# Patient Record
Sex: FEMALE | Race: BLACK OR AFRICAN AMERICAN | Employment: FULL TIME | URBAN - METROPOLITAN AREA
[De-identification: names, ages, dates, MRNs, and addresses within clinical notes are randomized per-mention and may not be internally consistent; named-entity substitution may affect disease eponyms.]

---

## 2018-07-02 ENCOUNTER — HOSPITAL ENCOUNTER (OUTPATIENT)
Dept: PHYSICAL THERAPY | Age: 38
End: 2018-07-02

## 2018-09-11 ENCOUNTER — HOSPITAL ENCOUNTER (OUTPATIENT)
Dept: PHYSICAL THERAPY | Age: 38
Discharge: HOME | End: 2018-09-11
Payer: MEDICAID

## 2018-09-11 PROCEDURE — 97162 PT EVAL MOD COMPLEX 30 MIN: CPT

## 2018-09-11 PROCEDURE — 97110 THERAPEUTIC EXERCISES: CPT
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
## 2018-09-11 NOTE — PROGRESS NOTES
PT DAILY TREATMENT NOTE     Patient Name: Lucio Ramos  Date:2018  : 1980  [x]  Patient  Verified  Payor: BLUE CROSS MEDICAID / Plan: [place] Amend / Product Type: Managed Care Medicaid /    In time:10:30  Out time:11:25  Total Treatment Time (min): 55  Visit #: 1 of 8    Treatment Area: Low back pain [M54.5]  Neck pain [M54.2]    SUBJECTIVE  Pain Level (0-10 scale): 7/10  Any medication changes, allergies to medications, adverse drug reactions, diagnosis change, or new procedure performed?: [x] No    [] Yes (see summary sheet for update)  Subjective functional status/changes:   [] No changes reported  SEE POC    OBJECTIVE  Physical Therapy Evaluation Cervical Spine - [facility]    OBJECTIVE  Posture: [] WNL  Head Position: forward head posture   Shoulder/Scapular Position: rounded shoulders  C-Kyphosis:  [] increased   [] decreased   C-Lordosis:   [] increased   [] decreased  T-Kyphosis:  [] increased   [] decreased  T-Lordosis:   [] increased   [] decreased     TMJ: [] N/A [] Abnormal - ROM:   Palpation:    Cervical Retraction: [] WNL    [] Abnormal:    Shoulder/Scapular Screen: [] WNL    [] Abnormal:    Active Movements: [] N/A   [] Too acute   [] Other:  ROM % AROM % PROM Comments:pain, area   Forward flexion 50     Extension 30     SB right 30     SB left  30     Rotation right 50     Rotation left 50       Thoracic Spine: [] N/A    [] WNL   [] Other:    PROM:    Palpation:  [] Min  [] Mod  [] Severe    Location:  [] Min  [] Mod  [] Severe    Location:  [] Min  [] Mod  [] Severe    Location:    Neuro Screen (myotome/dematome/felexes): [x] WNL  Myotome Level Muscle Test Myotome Level Muscle Test   C5 Shoulder Adduction - Deltoid C8 Finger Flexors   C6 Wrist Extension T1 Finger Abduction - Interossei   C7 Elbow Extension     Comments:    Gait:  [] Normal     [] Abnormal:    Active Movements: [] N/A   [] Too acute   [] Other:guarded secondary to pain in all directions  ROM % AROM % PROM Comments:pain, area   Forward flexion 40-60      Extension 20-30      SB right 20-30      SB left 20-30      Rotation right 5-10      Rotation left 5-10        Repeated Movements   Effects on present pain: produces (AK), abolishes (A), increases (incr), decreases (decr), centralizes (C), peripheral (PH), no effect (NE)   Pre-Test Sx Flexion Repeated Flexion Extension Repeated Extension Repeated SBL Repeated SBR   Sitting          Standing          Lying      N/A N/A   Comments:  Side Glide:  Sustained passive positioning test:    Neuro Screen [] WNL  Myotome/Dermatome/Reflexes: L5 affected with dermatomal levels left LE   Comments:    Dural Mobility:  SLR Sitting: [] R    [] L    [] +    [] -  @ (degrees):           Supine: [] R    [] L    [] +    [] -  @ (degrees):   Slump Test: [] R    [] L    [] +    [] -  @ (degrees):   Prone Knee Bend: [] R    [] L    [] +    [] -     Palpation  [] Min  [x] Mod  [] Severe    Location:paraspinals and L5 central anterior/posterior mobilization  [] Min  [] Mod  [] Severe    Location:  [] Min  [] Mod  [] Severe    Location:    Stabilization Tests  Multifidus Test  Level 1: Prone abdominal draw in (Goal 6-10mmHG):  Level 2: Supported leg load supine (needle deflection at 40mmHG): [] Yes  [] No   Level 3: Unsupported leg load supine (needle deflection at 40mmHG): [] Yes  [] No     Strength   L(0-5) R (0-5) N/T   Hip Flexion (L1,2) 3 3+ []   Knee Extension (L3,4) 4 4 []   Ankle Dorsiflexion (L4) 3+ 4- []   Great Toe Extension (L5)   []   Ankle Plantarflexion (S1)   []   Knee Flexion (S1,2) 3 4- []   Upper Abdominals   []   Lower Abdominals   []   Paraspinals   []   Back Rotators   []   Gluteus Darron   []   Other   []     Special Tests      25 min [x]Eval                  []Re-Eval       30 min Therapeutic Exercise:  [] See flow sheet :   Rationale: increase ROM, increase strength and improve coordination to improve the patients ability to increase patient's functional mobility and reviewed and provided with HEP      Other Objective/Functional Measures: FOTO 44     Pain Level (0-10 scale) post treatment: 6//10    ASSESSMENT/Changes in Function:   See POC  []  See Plan of Care  []  See progress note/recertification  []  See Discharge Summary         Progress towards goals / Updated goals:  SEE POC    PLAN  []  Upgrade activities as tolerated     [x]  Continue plan of care  []  Update interventions per flow sheet       []  Discharge due to:_  []  Other:_      Keyana Juarez 9/11/2018  10:36 AM    No future appointments.

## 2018-09-19 ENCOUNTER — HOSPITAL ENCOUNTER (OUTPATIENT)
Dept: PHYSICAL THERAPY | Age: 38
Discharge: HOME | End: 2018-09-19
Payer: MEDICAID
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
## 2018-09-21 ENCOUNTER — APPOINTMENT (OUTPATIENT)
Dept: PHYSICAL THERAPY | Age: 38
End: 2018-09-21
Payer: MEDICAID

## 2018-09-26 ENCOUNTER — HOSPITAL ENCOUNTER (OUTPATIENT)
Dept: PHYSICAL THERAPY | Age: 38
Discharge: HOME | End: 2018-09-26
Payer: MEDICAID

## 2018-09-26 PROCEDURE — 97140 MANUAL THERAPY 1/> REGIONS: CPT | Performed by: PHYSICAL THERAPIST

## 2018-09-26 PROCEDURE — 97110 THERAPEUTIC EXERCISES: CPT | Performed by: PHYSICAL THERAPIST
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
## 2018-09-28 ENCOUNTER — HOSPITAL ENCOUNTER (OUTPATIENT)
Dept: PHYSICAL THERAPY | Age: 38
Discharge: HOME | End: 2018-09-28
Payer: MEDICAID

## 2018-09-28 PROCEDURE — 97113 AQUATIC THERAPY/EXERCISES: CPT

## 2018-10-03 ENCOUNTER — HOSPITAL ENCOUNTER (OUTPATIENT)
Dept: PHYSICAL THERAPY | Age: 38
Discharge: HOME | End: 2018-10-03
Payer: MEDICAID

## 2018-10-03 PROCEDURE — 97113 AQUATIC THERAPY/EXERCISES: CPT

## 2018-10-04 ENCOUNTER — HOSPITAL ENCOUNTER (OUTPATIENT)
Dept: PHYSICAL THERAPY | Age: 38
Discharge: HOME | End: 2018-10-04
Payer: MEDICAID

## 2018-10-04 PROCEDURE — 97530 THERAPEUTIC ACTIVITIES: CPT

## 2018-10-04 PROCEDURE — 97110 THERAPEUTIC EXERCISES: CPT

## 2018-10-04 NOTE — PROGRESS NOTES
PT DAILY TREATMENT NOTE     Patient Name: Rush Braxton  Date:10/4/2018  : 1980  [x]  Patient  Verified  Payor: BLUE CROSS MEDICAID / Plan: [place] / Product Type: Managed Care Medicaid /    In time:11:30  Out time:12:27  Total Treatment Time (min): 57  Visit #: 5 of 8    Treatment Area: Low back pain [M54.5]  Neck pain [M54.2]    SUBJECTIVE  Pain Level (0-10 scale): 7/10  Any medication changes, allergies to medications, adverse drug reactions, diagnosis change, or new procedure performed?: [x] No    [] Yes (see summary sheet for update)  Subjective functional status/changes:   [] No changes reported  \"I have pain in my Right arm and down my legs that changes. My arm is the more painful areas. \"    OBJECTIVE      38 min Therapeutic Exercise:  [x] See flow sheet :   Rationale: increase ROM, increase strength and improve coordination to improve the patients ability to return to prior level of physical activity. 19 min Therapeutic Activity:  [x]  See flow sheet :   Rationale: increase ROM, increase strength and improve coordination  to improve the patients ability to perform ADLs with less pain. With   [] TE   [] TA   [] neuro   [] other: Patient Education: [x] Review HEP    [] Progressed/Changed HEP based on:   [] positioning   [] body mechanics   [] transfers   [] heat/ice application    [] other:      Other Objective/Functional Measures:      Pain Level (0-10 scale) post treatment: 5/10    ASSESSMENT/Changes in Function: Pt continues to report high levels of pain in neck and back. Pt reports consistent radicular pain in Right UE predominately distal to the elbow. Pt reports sporadic pain down B LE but seeming to change constantly from day to day. Pt performed therex well with general discomfort but progressively improved throughout session. Pt reported overall decreased pain and intensity but continued radicular signs in UE.      Patient will continue to benefit from skilled PT services to modify and progress therapeutic interventions, address functional mobility deficits, address ROM deficits, address strength deficits, analyze and address soft tissue restrictions, analyze and cue movement patterns, analyze and modify body mechanics/ergonomics and assess and modify postural abnormalities to attain remaining goals. []  See Plan of Care  []  See progress note/recertification  []  See Discharge Summary         Progress towards goals / Updated goals:  Short Term Goals: To be accomplished in 2 weeks:  1. Patient will be compliant with HEP as PT progresses to increase tolerance with functional activities. Status @ Eval: initiated HEP  Current : taught ex pt to incorporate in HEP      2. Patient will have improved cervical and lumbar AROM to 80% WNL to increase tolerance with transfers. Status @ Eval: guarded lumbar AROM and cervical below  Active Movements: [] N/A   [] Too acute   [] Other:  ROM % AROM % PROM Comments:pain, area   Forward flexion 50         Extension 30         SB right 30         SB left  30         Rotation right 50         Rotation left 50          3. Patient will report decreased pain to 5/10 at most with activities to be able to tolerate functional activities. Status @ Eval: 6/10 today worst 1/0/10      Long Term Goals: To be accomplished in 4 weeks:  1. Patient will be independent with HEP as PT progresses at time of discharge to be able to continue with maintenance program.  Status @ Eval: initiated  2. Patient will have increased LE strength by 1/2 grade to be able to tolerate prolonged ambulation while walking dog.   Status @ Eval: Strength     L(0-5) R (0-5) N/T   Hip Flexion (L1,2) 3 3+ []   Knee Extension (L3,4) 4 4 []   Ankle Dorsiflexion (L4) 3+ 4- []   Great Toe Extension (L5)       []   Ankle Plantarflexion (S1)       []   Knee Flexion (S1,2) 3 4- []   Upper Abdominals       []   Lower Abdominals       []   Paraspinals       []   Back Rotators       []   Gluteus Darron       []   Other       []    3. Patient will have decreased pain to 3/10 at most with activities to be able to tolerate functional activities. Status@ Eval: 6/10 today, 10/10 at worst  4. Patient will have improved FOTO score by 10 points indicating improvement in functional activities.   Status @ Eval: 40             PLAN  [x]  Upgrade activities as tolerated     [x]  Continue plan of care  []  Update interventions per flow sheet       []  Discharge due to:_  []  Other:_      Blaire Fletcher PTA 10/4/2018  11:55 AM    Future Appointments  Date Time Provider Ivan Nails   10/8/2018 11:00 AM Ling Kincaid, PT, DPT [facility]   10/10/2018 9:30 AM INDIA Mckeon [facility]

## 2018-10-08 ENCOUNTER — HOSPITAL ENCOUNTER (OUTPATIENT)
Dept: PHYSICAL THERAPY | Age: 38
Discharge: HOME | End: 2018-10-08
Payer: MEDICAID

## 2018-10-08 PROCEDURE — 97110 THERAPEUTIC EXERCISES: CPT | Performed by: PHYSICAL THERAPIST

## 2018-10-08 PROCEDURE — 97530 THERAPEUTIC ACTIVITIES: CPT | Performed by: PHYSICAL THERAPIST

## 2018-10-08 NOTE — PROGRESS NOTES
PT DAILY TREATMENT NOTE 12-16    Patient Name: Aileen Tao  Date:10/8/2018  : 1980  [x]  Patient  Verified  Payor: BLUE CROSS MEDICAID / Plan: Rozina Wren / Product Type: Managed Care Medicaid /    In time:11:10  Out time:11:51  Total Treatment Time (min): 41  Visit #: 6 of 8    Treatment Area: Low back pain [M54.5]  Neck pain [M54.2]    SUBJECTIVE  Pain Level (0-10 scale): 6  Any medication changes, allergies to medications, adverse drug reactions, diagnosis change, or new procedure performed?: [x] No    [] Yes (see summary sheet for update)  Subjective functional status/changes:   [] No changes reported  Feels alright. The arm is still hurting. No new issues. OBJECTIVE    30 min Therapeutic Exercise:  [x] See flow sheet :   Rationale: increase ROM, increase strength and decrease pain to improve the patients ability to complete ADLs    11 min Therapeutic Activity:  [x]  See flow sheet :   Rationale: increase ROM, increase strength and improve coordination  to improve the patients ability to complete ADLs            With   [] TE   [] TA   [] neuro   [] other: Patient Education: [x] Review HEP    [] Progressed/Changed HEP based on:   [] positioning   [] body mechanics   [] transfers   [] heat/ice application    [] other:      Other Objective/Functional Measures:      Pain Level (0-10 scale) post treatment: 6    ASSESSMENT/Changes in Function: Patient responds well to treatment session. Taken through lumbar mobility exercises today. No difficulty, reports benefit. Will reassess in next 1-2 sessions. . No adverse effects were noted from today's treatment session      Patient will continue to benefit from skilled PT services to modify and progress therapeutic interventions, address functional mobility deficits, address ROM deficits, address strength deficits, analyze and address soft tissue restrictions, analyze and cue movement patterns, analyze and modify body mechanics/ergonomics and assess and modify postural abnormalities to attain remaining goals. []  See Plan of Care  []  See progress note/recertification  []  See Discharge Summary         Progress towards goals / Updated goals:  Short Term Goals: To be accomplished in 2 weeks:  1. Patient will be compliant with HEP as PT progresses to increase tolerance with functional activities. Status @ Eval: initiated HEP  Current : taught ex pt to incorporate in HEP       2. Patient will have improved cervical and lumbar AROM to 80% WNL to increase tolerance with transfers. Status @ Eval: guarded lumbar AROM and cervical below  Active Movements: [] N/A   [] Too acute   [] Other:  ROM % AROM % PROM Comments:pain, area   Forward flexion 50         Extension 30         SB right 30         SB left  30         Rotation right 50         Rotation left 50          3. Patient will report decreased pain to 5/10 at most with activities to be able to tolerate functional activities. Status @ Eval: 6/10 today worst 1/0/10      Long Term Goals: To be accomplished in 4 weeks:  1. Patient will be independent with HEP as PT progresses at time of discharge to be able to continue with maintenance program.  Status @ Eval: initiated  2. Patient will have increased LE strength by 1/2 grade to be able to tolerate prolonged ambulation while walking dog. Status @ Eval: Strength     L(0-5) R (0-5) N/T   Hip Flexion (L1,2) 3 3+ []   Knee Extension (L3,4) 4 4 []   Ankle Dorsiflexion (L4) 3+ 4- []   Great Toe Extension (L5)       []   Ankle Plantarflexion (S1)       []   Knee Flexion (S1,2) 3 4- []   Upper Abdominals       []   Lower Abdominals       []   Paraspinals       []   Back Rotators       []   Gluteus Darron       []   Other       []    3. Patient will have decreased pain to 3/10 at most with activities to be able to tolerate functional activities. Status@ Eval: 6/10 today, 10/10 at worst  4.  Patient will have improved FOTO score by 10 points indicating improvement in functional activities.   Status @ Eval: 40       PLAN  []  Upgrade activities as tolerated     [x]  Continue plan of care  []  Update interventions per flow sheet       []  Discharge due to:_  []  Other:_      Delio Garrido PT, DPT 10/8/2018  11:48 AM    Future Appointments  Date Time Provider Ivan Nails   10/10/2018 9:30 AM INDIA Segal [facility]   10/16/2018 10:30 AM INDIA Segal [facility]   10/19/2018 11:00 AM INDIA Segal [facility]

## 2018-10-10 ENCOUNTER — HOSPITAL ENCOUNTER (OUTPATIENT)
Dept: PHYSICAL THERAPY | Age: 38
Discharge: HOME | End: 2018-10-10
Payer: MEDICAID

## 2018-10-10 PROCEDURE — 97113 AQUATIC THERAPY/EXERCISES: CPT

## 2018-10-10 NOTE — PROGRESS NOTES
PHYSICAL THERAPY - DAILY TREATMENT NOTE - AQUATIC THERAPY    Patient Name: Allyson Hernandez        Date: 10/10/2018  : 1980   YES Patient  Verified  Visit #:   7   of   8  Insurance: Payor: BLUE CROSS MEDICAID / Plan: Viviano Krabbe / Product Type: Managed Care Medicaid /      In time: 9:30 Out time: 10:15   Total Treatment Time: 45     Medicare/[facility] Time Tracking (below)   Total Timed Codes (min):   1:1 Treatment Time:       TREATMENT AREA =  Low back pain [M54.5]  Neck pain [M54.2]    SUBJECTIVE    Pain Level (on 0 to 10 scale):  5  / 10   Medication Changes/New allergies or changes in medical history, any new surgeries or procedures? NO    If yes, update Summary List   Subjective Functional Status/Changes:  []  No changes reported     \"The pain I really just in the arm. \"          OBJECTIVE      45 min Aquatic Therapy:  [x]  See flow sheet   Rationale:      increase ROM, increase strength, improve coordination and improve balance to improve the patients ability to return to prior level of physical activity. min Patient Education:  NO  Reviewed HEP   []  Progressed/Changed HEP based on: Other Objective/Functional Measures:         Post Treatment Pain Level (on 0 to 10) scale:   5  / 10     ASSESSMENT  Assessment/Changes in Function:     Pt reported increased fatigue with therex but no change in UE pain. Pt progressing with tolerance to activity slowly. []  See Progress Note/Recertification   Patient will continue to benefit from skilled PT services to modify and progress therapeutic interventions, address functional mobility deficits, address ROM deficits, address strength deficits, analyze and address soft tissue restrictions, analyze and cue movement patterns, analyze and modify body mechanics/ergonomics and assess and modify postural abnormalities to attain remaining goals.    Progress toward goals / Updated goals:    Short Term Goals: To be accomplished in 2 weeks:  1. Patient will be compliant with HEP as PT progresses to increase tolerance with functional activities. Status @ Eval: initiated HEP  Current : taught ex pt to incorporate in HEP       2. Patient will have improved cervical and lumbar AROM to 80% WNL to increase tolerance with transfers. Status @ Eval: guarded lumbar AROM and cervical below  Active Movements: [] N/A   [] Too acute   [] Other:  ROM % AROM % PROM Comments:pain, area   Forward flexion 50         Extension 30         SB right 30         SB left  30         Rotation right 50         Rotation left 50          3. Patient will report decreased pain to 5/10 at most with activities to be able to tolerate functional activities. Status @ Eval: 6/10 today worst 1/0/10      Long Term Goals: To be accomplished in 4 weeks:  1. Patient will be independent with HEP as PT progresses at time of discharge to be able to continue with maintenance program.  Status @ Eval: initiated  2. Patient will have increased LE strength by 1/2 grade to be able to tolerate prolonged ambulation while walking dog. Status @ Eval: Strength     L(0-5) R (0-5) N/T   Hip Flexion (L1,2) 3 3+ []   Knee Extension (L3,4) 4 4 []   Ankle Dorsiflexion (L4) 3+ 4- []   Great Toe Extension (L5)       []   Ankle Plantarflexion (S1)       []   Knee Flexion (S1,2) 3 4- []   Upper Abdominals       []   Lower Abdominals       []   Paraspinals       []   Back Rotators       []   Gluteus Darron       []   Other       []    3. Patient will have decreased pain to 3/10 at most with activities to be able to tolerate functional activities. Status@ Eval: 6/10 today, 10/10 at worst  4. Patient will have improved FOTO score by 10 points indicating improvement in functional activities.   Status @ Eval: 40       PLAN  [x]  Upgrade activities as tolerated YES Continue plan of care   []  Discharge due to :    []  Other:      Therapist: Lencho Garcia PTA    Date: 10/10/2018 Time: 1:55 PM     Future Appointments  Date Time Provider Ivan Nails   10/15/2018 10:00 AM Farhana Brown PT, DPT [facility]   10/16/2018 11:30 AM Farhana Brown PT, DPT [facility]   10/19/2018 11:00 AM INDIA Stroud [facility]

## 2018-10-15 ENCOUNTER — HOSPITAL ENCOUNTER (OUTPATIENT)
Dept: PHYSICAL THERAPY | Age: 38
Discharge: HOME | End: 2018-10-15
Payer: MEDICAID

## 2018-10-15 PROCEDURE — 97110 THERAPEUTIC EXERCISES: CPT | Performed by: PHYSICAL THERAPIST

## 2018-10-15 NOTE — PROGRESS NOTES
PT DAILY TREATMENT NOTE     Patient Name: Kemar Jett  Date:10/15/2018  : 1980  [x]  Patient  Verified  Payor: BLUE CROSS MEDICAID / Plan: Jarrod Denny / Product Type: Managed Care Medicaid /    In time:10:00  Out time:11:48  Total Treatment Time (min): 48  Visit #: 8 of 8    Treatment Area: Low back pain [M54.5]  Neck pain [M54.2]    SUBJECTIVE  Pain Level (0-10 scale): 6  Any medication changes, allergies to medications, adverse drug reactions, diagnosis change, or new procedure performed?: [x] No    [] Yes (see summary sheet for update)  Subjective functional status/changes:   [] No changes reported  Is getting tired of the pain. It's wearing on her    OBJECTIVE      48 min Therapeutic Exercise:  [x] See flow sheet :   Rationale: increase ROM, increase strength and decrease pain to improve the patients ability to complete ADLs          With   [] TE   [] TA   [] neuro   [] other: Patient Education: [x] Review HEP    [] Progressed/Changed HEP based on:   [] positioning   [] body mechanics   [] transfers   [] heat/ice application    [] other:      Other Objective/Functional Measures:      Strength Left Right    (at side) 70lbs 40lbs   Pinch  5.3lbs 4.7lbs             Lumbar ROMl: ~75% in all planes     Pain Level (0-10 scale) post treatment: 7    ASSESSMENT/Changes in Function: Patient responds well to treatment session. Patient has made minimal progress to date. Pain remains elevated since YOUSIF, strength seems decreased secondary to pain.  . No adverse effects were noted from today's treatment session      Patient will continue to benefit from skilled PT services to modify and progress therapeutic interventions, address functional mobility deficits, address ROM deficits, address strength deficits, analyze and address soft tissue restrictions, analyze and cue movement patterns, analyze and modify body mechanics/ergonomics and assess and modify postural abnormalities to attain remaining goals. []  See Plan of Care  [x]  See progress note/recertification  []  See Discharge Summary         Progress towards goals / Updated goals:  Short Term Goals: To be accomplished in 2 weeks:  1. Patient will be compliant with HEP as PT progresses to increase tolerance with functional activities. Status @ Eval: initiated HEP  Current : taught ex pt to incorporate in HEP       2. Patient will have improved cervical and lumbar AROM to 80% WNL to increase tolerance with transfers. Status @ Eval: guarded lumbar AROM and cervical below  Active Movements: [] N/A   [] Too acute   [] Other:  ROM % AROM % PROM Comments:pain, area   Forward flexion 50 75        Extension 30  75      SB right 30   75      SB left  30   75      Rotation right 50   75      Rotation left 50   75       3. Patient will report decreased pain to 5/10 at most with activities to be able to tolerate functional activities. Status @ Eval: 6/10 today worst 1/0/10      Long Term Goals: To be accomplished in 4 weeks:  1. Patient will be independent with HEP as PT progresses at time of discharge to be able to continue with maintenance program.  Status @ Eval: initiated  2. Patient will have increased LE strength by 1/2 grade to be able to tolerate prolonged ambulation while walking dog. Status @ Eval: Strength     L(0-5) R (0-5) N/T   Hip Flexion (L1,2) 3 3+ []   Knee Extension (L3,4) 4 4 []   Ankle Dorsiflexion (L4) 3+ 4- []   Great Toe Extension (L5)       []   Ankle Plantarflexion (S1)       []   Knee Flexion (S1,2) 3 4- []   Upper Abdominals       []   Lower Abdominals       []   Paraspinals       []   Back Rotators       []   Gluteus Darron       []   Other       []    3. Patient will have decreased pain to 3/10 at most with activities to be able to tolerate functional activities. Status@ Eval: 6/10 today, 10/10 at worst  4.  Patient will have improved FOTO score by 10 points indicating improvement in functional activities.   Status @ Eval: 40      PLAN  []  Upgrade activities as tolerated     [x]  Continue plan of care  []  Update interventions per flow sheet       []  Discharge due to:_  []  Other:_      Yves Pichardo PT, DPT 10/15/2018  10:06 AM    Future Appointments  Date Time Provider Ivan Nails   10/16/2018 11:30 AM Yves Pichardo PT, DPT [facility]   10/19/2018 11:00 AM INDIA Johnson [facility]

## 2018-10-16 ENCOUNTER — HOSPITAL ENCOUNTER (OUTPATIENT)
Dept: PHYSICAL THERAPY | Age: 38
Discharge: HOME | End: 2018-10-16
Payer: MEDICAID

## 2018-10-16 PROCEDURE — 97110 THERAPEUTIC EXERCISES: CPT | Performed by: PHYSICAL THERAPIST

## 2018-10-16 PROCEDURE — 97530 THERAPEUTIC ACTIVITIES: CPT | Performed by: PHYSICAL THERAPIST

## 2018-10-16 NOTE — PROGRESS NOTES
PT DAILY TREATMENT NOTE     Patient Name: Jin Riojas  Date:10/16/2018  : 1980  [x]  Patient  Verified  Payor: BLUE CROSS MEDICAID / Plan: [employer] / Product Type: Managed Care Medicaid /    In time:11:02  Out time:11:48  Total Treatment Time (min): 46  Visit #:  16    Treatment Area: Low back pain [M54.5]  Neck pain [M54.2]    SUBJECTIVE  Pain Level (0-10 scale): 7  Any medication changes, allergies to medications, adverse drug reactions, diagnosis change, or new procedure performed?: [x] No    [] Yes (see summary sheet for update)  Subjective functional status/changes:   [] No changes reported  Lots of pain today. Went home and cleaned up the house a lot and the pain was much worse afterwards    OBJECTIVE    30 min Therapeutic Exercise:  [x] See flow sheet :   Rationale: increase ROM, increase strength and decrease pain to improve the patients ability to complete ADLs    16 min Therapeutic Activity:  [x]  See flow sheet :   Rationale: increase ROM, increase strength and improve coordination  to improve the patients ability to complete ADLs            With   [] TE   [] TA   [] neuro   [] other: Patient Education: [x] Review HEP    [] Progressed/Changed HEP based on:   [] positioning   [] body mechanics   [] transfers   [] heat/ice application    [] other:      Other Objective/Functional Measures:      Pain Level (0-10 scale) post treatment: 7    ASSESSMENT/Changes in Function: Patient responds well to treatment session. Has minimal difficulty with exercises as prescribed. Pain levels continue to be elevated. Provided with nerve glides.  No adverse effects were noted from today's treatment session      Patient will continue to benefit from skilled PT services to modify and progress therapeutic interventions, address functional mobility deficits, address ROM deficits, address strength deficits, analyze and address soft tissue restrictions, analyze and cue movement patterns, analyze and modify body mechanics/ergonomics and assess and modify postural abnormalities to attain remaining goals. []  See Plan of Care  []  See progress note/recertification  []  See Discharge Summary         Progress towards goals / Updated goals:  Updated Goals: to be achieved in 4 weeks:                        Short Term Goals: To be accomplished in 2 weeks:  1. Patient will be compliant with HEP as PT progresses to increase tolerance with functional activities. Status @ Eval: initiated HEP  Current : taught ex pt to incorporate in HEP       2. Patient will have improved cervical and lumbar AROM to 80% WNL to increase tolerance with transfers. Status @ Eval: guarded lumbar AROM and cervical below  Active Movements: [] N/A   [] Too acute   [] Other:  ROM % AROM % PROM Comments:pain, area   Forward flexion 50 75        Extension 30  75      SB right 30   75      SB left  30   75      Rotation right 50   75      Rotation left 50   75       3. Patient will report decreased pain to 5/10 at most with activities to be able to tolerate functional activities. Status @ Eval: 6/10 today worst 1/0/10      Long Term Goals: To be accomplished in 4 weeks:  1. Patient will be independent with HEP as PT progresses at time of discharge to be able to continue with maintenance program.  Status @ Eval: initiated  2. Patient will have increased LE strength by 1/2 grade to be able to tolerate prolonged ambulation while walking dog.   Status @ Eval: Strength     L(0-5) R (0-5) N/T   Hip Flexion (L1,2) 3 3+ []   Knee Extension (L3,4) 4 4 []   Ankle Dorsiflexion (L4) 3+ 4- []   Great Toe Extension (L5)       []   Ankle Plantarflexion (S1)       []   Knee Flexion (S1,2) 3 4- []   Upper Abdominals       []   Lower Abdominals       []   Paraspinals       []   Back Rotators       []   Gluteus Darron       []   Other       []    3. Patient will have decreased pain to 3/10 at most with activities to be able to tolerate functional activities. Status@ Eval: 6/10 today, 10/10 at worst  4. Patient will have improved FOTO score by 10 points indicating improvement in functional activities.   Status @ Eval: 40      PLAN  []  Upgrade activities as tolerated     [x]  Continue plan of care  []  Update interventions per flow sheet       []  Discharge due to:_  []  Other:_      Rosario Feng, PT, DPT 10/16/2018  11:22 AM    Future Appointments  Date Time Provider Ivan Nails   10/16/2018 11:30 AM [facility] Bone[facility]   10/19/2018 11:00 AM Tayler Agudelo, PTA [facility]   10/22/2018 10:30 AM Rosario Bone, PT, DPT [facility]   10/24/2018 10:30 AM Rosario Bone, PT, DPT [facility]   10/25/2018 9:00 AM Rosario Bone, PT, DPT [facility]   10/30/2018 11:00 AM Rosario Bone, PT, DPT [facility]   10/31/2018 10:30 AM Rosario Bone, PT, DPT [facility]   11/2/2018 11:00 AM Rosario Bone, PT, DPT [facility]

## 2018-10-16 NOTE — PROGRESS NOTES
[facility] at [address] [place] [address]  Phone: [phone]   Fax: [phone]    Progress Note  Patient name: Simona Ramos Start of Care: 2018   Referral source: Jazmine Moralez MD : 1980   Medical/Treatment Diagnosis: Low back pain [M54.5]  Neck pain [M54.2] Onset Date:2018     Prior Hospitalization: see medical history Provider#: [id number]   Medications: Verified on Patient Summary List    Comorbidities: OA  Prior Level of Function:independent with ADLs    Visits from Start of Care: 9    Missed Visits: 0    Established Goals:          Excellent Good         Limited           None  [x] Increased ROM   []  [x]  []  []  [x] Increased Strength  []  []  [x]  []  [x] Increased Mobility  []  [x]  []  []   [x] Decreased Pain   []  []  [x]  []  [] Decreased Swelling  []  []  []  []    Key Functional Changes: See Below  Updated Goals: to be achieved in 4 weeks:   Short Term Goals: To be accomplished in 2 weeks:  1. Patient will be compliant with HEP as PT progresses to increase tolerance with functional activities. Status @ Eval: initiated HEP  Current : taught ex pt to incorporate in HEP       2. Patient will have improved cervical and lumbar AROM to 80% WNL to increase tolerance with transfers. Status @ Eval: guarded lumbar AROM and cervical below  Active Movements: [] N/A   [] Too acute   [] Other:  ROM % AROM % PROM Comments:pain, area   Forward flexion 50 75        Extension 30  75      SB right 30   75      SB left  30   75      Rotation right 50   75      Rotation left 50   75       3. Patient will report decreased pain to 5/10 at most with activities to be able to tolerate functional activities. Status @ Eval: 6/10 today worst 1/0/10      Long Term Goals: To be accomplished in 4 weeks:  1.  Patient will be independent with HEP as PT progresses at time of discharge to be able to continue with maintenance program.  Status @ Eval: initiated  2. Patient will have increased LE strength by 1/2 grade to be able to tolerate prolonged ambulation while walking dog. Status @ Eval: Strength     L(0-5) R (0-5) N/T   Hip Flexion (L1,2) 3 3+ []   Knee Extension (L3,4) 4 4 []   Ankle Dorsiflexion (L4) 3+ 4- []   Great Toe Extension (L5)       []   Ankle Plantarflexion (S1)       []   Knee Flexion (S1,2) 3 4- []   Upper Abdominals       []   Lower Abdominals       []   Paraspinals       []   Back Rotators       []   Gluteus Darron       []   Other       []    3. Patient will have decreased pain to 3/10 at most with activities to be able to tolerate functional activities. Status@ Eval: 6/10 today, 10/10 at worst  4. Patient will have improved FOTO score by 10 points indicating improvement in functional activities. Status @ Eval: 44    ASSESSMENT/RECOMMENDATIONS:  Patient responds well to treatment session. Patient has made minimal progress to date. Pain remains elevated since YOUSIF, strength seems decreased secondary to pain. Patient will continue to benefit from skilled PT services to modify and progress therapeutic interventions, address functional mobility deficits, address ROM deficits, address strength deficits, analyze and address soft tissue restrictions, analyze and cue movement patterns, analyze and modify body mechanics/ergonomics and assess and modify postural abnormalities to attain remaining goals.     [x]Continue therapy per initial plan/protocol at a frequency of  2 x per week for 4 weeks  []Continue therapy with the following recommended changes:_____________________      _____________________________________________________________________  []Discontinue therapy progressing towards or have reached established goals  []Discontinue therapy due to lack of appreciable progress towards goals  []Discontinue therapy due to lack of attendance or compliance  []Await Physician's recommendations/decisions regarding therapy  []Other:________________________________________________________________    Thank you for this referral.   Karna Simmonds, PT, DPT 10/16/2018 2:11 PM  NOTE TO PHYSICIAN:  [address]   FAX TO [facility]: [id number]) [phone]  If you are unable to process this request in 24 hours please contact our office:   [phone]  []  I have read the above report and request that my patient continue as recommended. []  I have read the above report and request that my patient continue therapy with the following changes/special instructions:________________________________________  []I have read the above report and request that my patient be discharged from therapy.     Physicians signature: ______________________________Date: ______Time:______

## 2018-10-19 ENCOUNTER — HOSPITAL ENCOUNTER (OUTPATIENT)
Dept: PHYSICAL THERAPY | Age: 38
Discharge: HOME | End: 2018-10-19
Payer: MEDICAID

## 2018-10-19 PROCEDURE — 97113 AQUATIC THERAPY/EXERCISES: CPT

## 2018-10-19 NOTE — PROGRESS NOTES
PHYSICAL THERAPY - DAILY TREATMENT NOTE - AQUATIC THERAPY    Patient Name: Armida Bangura        Date: 10/19/2018  : 1980   YES Patient  Verified  Visit #:   10   of   16  Insurance: Payor: BLUE CROSS MEDICAID / Plan: Kate Schlatter / Product Type: Managed Care Medicaid /      In time: 10:45 Out time: 11:25   Total Treatment Time: 45     Medicare/BCBS Time Tracking (below)   Total Timed Codes (min):  45 1:1 Treatment Time:  25     TREATMENT AREA =  Low back pain [M54.5]  Neck pain [M54.2]    SUBJECTIVE    Pain Level (on 0 to 10 scale):  3  / 10   Medication Changes/New allergies or changes in medical history, any new surgeries or procedures? NO    If yes, update Summary List   Subjective Functional Status/Changes:  []  No changes reported     \"I actually don't have a lot of pain. I'm having a pretty good day. '          OBJECTIVE      45 min Aquatic Therapy:  [x]  See flow sheet   Rationale:      increase ROM, increase strength and improve coordination to improve the patients ability to return to prior level of physical activity. min Patient Education:  YES  Reviewed HEP   []  Progressed/Changed HEP based on: Other Objective/Functional Measures:         Post Treatment Pain Level (on 0 to 10) scale:   3  / 10     ASSESSMENT  Assessment/Changes in Function:     Pt tolerated aqua session very well. Due to decreased chagllenge of aquatic ex, pt is no longer appropriate for pool. Pt will attend land therapy for future poc. []  See Progress Note/Recertification   Patient will continue to benefit from skilled PT services to modify and progress therapeutic interventions, address functional mobility deficits, address ROM deficits, address strength deficits, analyze and address soft tissue restrictions, analyze and cue movement patterns, analyze and modify body mechanics/ergonomics and assess and modify postural abnormalities to attain remaining goals.    Progress toward goals / Updated goals:        Short Term Goals: To be accomplished in 2 weeks:  1. Patient will be compliant with HEP as PT progresses to increase tolerance with functional activities. Status @ Eval: initiated HEP  Current : taught ex pt to incorporate in HEP       2. Patient will have improved cervical and lumbar AROM to 80% WNL to increase tolerance with transfers. Status @ Eval: guarded lumbar AROM and cervical below  Active Movements: [] N/A   [] Too acute   [] Other:  ROM % AROM % PROM Comments:pain, area   Forward flexion 50 75        Extension 30  75      SB right 30   75      SB left  30   75      Rotation right 50   75      Rotation left 50   75       3. Patient will report decreased pain to 5/10 at most with activities to be able to tolerate functional activities. Status @ Eval: 6/10 today worst 1/0/10      Long Term Goals: To be accomplished in 4 weeks:  1. Patient will be independent with HEP as PT progresses at time of discharge to be able to continue with maintenance program.  Status @ Eval: initiated  2. Patient will have increased LE strength by 1/2 grade to be able to tolerate prolonged ambulation while walking dog. Status @ Eval: Strength     L(0-5) R (0-5) N/T   Hip Flexion (L1,2) 3 3+ []    Knee Extension (L3,4) 4 4 []    Ankle Dorsiflexion (L4) 3+ 4- []    Great Toe Extension (L5)       []    Ankle Plantarflexion (S1)       []    Knee Flexion (S1,2) 3 4- []    Upper Abdominals       []    Lower Abdominals       []    Paraspinals       []    Back Rotators       []    Gluteus Darron       []    Other       []     3. Patient will have decreased pain to 3/10 at most with activities to be able to tolerate functional activities. Status@ Eval: 6/10 today, 10/10 at worst  4. Patient will have improved FOTO score by 10 points indicating improvement in functional activities.   Status @ Eval: 44              PLAN  [x]  Upgrade activities as tolerated YES Continue plan of care   []  Discharge due to :    [] Other:      Therapist: Tayler Agudelo PTA    Date: 10/19/2018 Time: 1:41 PM     Future Appointments   Date Time Provider Ivan Nails   10/22/2018 10:30 AM Néstor Barba PT, DPT [facility]   10/24/2018 10:30 AM Néstor Barba PT, DPT [facility]   10/25/2018  9:00 AM Néstor Barba PT, DPT [facility]   10/30/2018 11:00 AM Néstor Barba PT, DPT [facility]   10/31/2018 10:30 AM Néstor Barba PT, DPT [facility]   11/2/2018 11:00 AM Néstor Barba PT, DPT [facility]

## 2018-10-22 ENCOUNTER — HOSPITAL ENCOUNTER (OUTPATIENT)
Dept: PHYSICAL THERAPY | Age: 38
Discharge: HOME | End: 2018-10-22
Payer: MEDICAID

## 2018-10-22 PROCEDURE — 97110 THERAPEUTIC EXERCISES: CPT | Performed by: PHYSICAL THERAPIST

## 2018-10-22 NOTE — PROGRESS NOTES
PT DAILY TREATMENT NOTE 12    Patient Name: Allyson Hernandez  Date:10/22/2018  : 1980  [x]  Patient  Verified  Payor: BLUE CROSS MEDICAID / Plan: Viviano Krabbe / Product Type: Managed Care Medicaid /    In time:10:25  Out time:11:00  Total Treatment Time (min): 35  Visit #: 11 of 18    Treatment Area: Low back pain [M54.5]  Neck pain [M54.2]    SUBJECTIVE  Pain Level (0-10 scale): 6  Any medication changes, allergies to medications, adverse drug reactions, diagnosis change, or new procedure performed?: [x] No    [] Yes (see summary sheet for update)  Subjective functional status/changes:   [] No changes reported  Feels so-so    OBJECTIVE    35 min Therapeutic Exercise:  [x] See flow sheet :   Rationale: increase ROM, increase strength and decrease pain to improve the patients ability to complete ADLs          With   [] TE   [] TA   [] neuro   [] other: Patient Education: [x] Review HEP    [] Progressed/Changed HEP based on:   [] positioning   [] body mechanics   [] transfers   [] heat/ice application    [] other:      Other Objective/Functional Measures:      Pain Level (0-10 scale) post treatment: 6    ASSESSMENT/Changes in Function: Patient responds well to treatment session. Patient difficulty with exercises due to pain. No adverse effects were noted from today's treatment session      Patient will continue to benefit from skilled PT services to modify and progress therapeutic interventions, address functional mobility deficits, address ROM deficits, address strength deficits, analyze and address soft tissue restrictions, analyze and cue movement patterns, analyze and modify body mechanics/ergonomics and assess and modify postural abnormalities to attain remaining goals.      []  See Plan of Care  []  See progress note/recertification  []  See Discharge Summary         Progress towards goals / Updated goals:  Progress toward goals / Updated goals:         Short Term Goals: To be accomplished in 2 weeks:  1. Patient will be compliant with HEP as PT progresses to increase tolerance with functional activities. Status @ Eval: initiated HEP  Current : taught ex pt to incorporate in HEP       2. Patient will have improved cervical and lumbar AROM to 80% WNL to increase tolerance with transfers. Status @ Eval: guarded lumbar AROM and cervical below  Active Movements: [] N/A   [] Too acute   [] Other:  ROM % AROM % PROM Comments:pain, area   Forward flexion 50 75        Extension 30  75      SB right 30   75      SB left  30   75      Rotation right 50   75      Rotation left 50   75       3. Patient will report decreased pain to 5/10 at most with activities to be able to tolerate functional activities. Status @ Eval: 6/10 today worst 1/0/10      Long Term Goals: To be accomplished in 4 weeks:  1. Patient will be independent with HEP as PT progresses at time of discharge to be able to continue with maintenance program.  Status @ Eval: initiated  2. Patient will have increased LE strength by 1/2 grade to be able to tolerate prolonged ambulation while walking dog. Status @ Eval: Strength     L(0-5) R (0-5) N/T   Hip Flexion (L1,2) 3 3+ []    Knee Extension (L3,4) 4 4 []    Ankle Dorsiflexion (L4) 3+ 4- []    Great Toe Extension (L5)       []    Ankle Plantarflexion (S1)       []    Knee Flexion (S1,2) 3 4- []    Upper Abdominals       []    Lower Abdominals       []    Paraspinals       []    Back Rotators       []    Gluteus Darron       []    Other       []     3. Patient will have decreased pain to 3/10 at most with activities to be able to tolerate functional activities. Status@ Eval: 6/10 today, 10/10 at worst  4. Patient will have improved FOTO score by 10 points indicating improvement in functional activities.   Status @ Eval: 40          PLAN  []  Upgrade activities as tolerated     [x]  Continue plan of care  []  Update interventions per flow sheet       []  Discharge due to:_  [] Other:_      Danny Presley, PT, DPT 10/22/2018  7:50 PM    Future Appointments   Date Time Provider Ivan Nials   10/24/2018 10:30 AM [facility]   10/25/2018  9:00 AM Homer Garner PT, DPT [facility]   10/30/2018 11:00 AM Homer Garner PT, DPT [facility]   10/31/2018 10:30 AM Homer Garner PT, DPT [facility]   11/2/2018 11:00 AM Homer Garner PT, DPT [facility]

## 2018-10-24 ENCOUNTER — HOSPITAL ENCOUNTER (OUTPATIENT)
Dept: PHYSICAL THERAPY | Age: 38
Discharge: HOME | End: 2018-10-24
Payer: MEDICAID

## 2018-10-24 PROCEDURE — 97110 THERAPEUTIC EXERCISES: CPT | Performed by: PHYSICAL THERAPIST

## 2018-10-24 NOTE — PROGRESS NOTES
PT DAILY TREATMENT NOTE     Patient Name: Aniceto Hughes  Date:10/24/2018  : 1980  [x]  Patient  Verified  Payor: BLUE CROSS MEDICAID / Plan: Santos Rogers / Product Type: Managed Care Medicaid /    In time:10:25  Out time:11:03  Total Treatment Time (min): 38  Visit #: 12 of 18    Treatment Area: Low back pain [M54.5]  Neck pain [M54.2]    SUBJECTIVE  Pain Level (0-10 scale): 4-5  Any medication changes, allergies to medications, adverse drug reactions, diagnosis change, or new procedure performed?: [x] No    [] Yes (see summary sheet for update)  Subjective functional status/changes:   [] No changes reported  Feel so-so    OBJECTIVE    38 min Therapeutic Exercise:  [] See flow sheet :   Rationale: increase ROM, increase strength and decrease pain to improve the patients ability to complete ADLs    With   [] TE   [] TA   [] neuro   [] other: Patient Education: [x] Review HEP    [] Progressed/Changed HEP based on:   [] positioning   [] body mechanics   [] transfers   [] heat/ice application    [] other:      Other Objective/Functional Measures:      Pain Level (0-10 scale) post treatment: 4    ASSESSMENT/Changes in Function: Patient responds well to treatment session. Patient appears to be progressing well. Has decreased pain. Increased activity tolerance. No adverse effects were noted from today's treatment session      Patient will continue to benefit from skilled PT services to modify and progress therapeutic interventions, address functional mobility deficits, address ROM deficits, address strength deficits, analyze and address soft tissue restrictions, analyze and cue movement patterns, analyze and modify body mechanics/ergonomics and assess and modify postural abnormalities to attain remaining goals.      []  See Plan of Care  []  See progress note/recertification  []  See Discharge Summary         Progress towards goals / Updated goals:        Short Term Goals: To be accomplished in 2 weeks:  1. Patient will be compliant with HEP as PT progresses to increase tolerance with functional activities. Status @ Eval: initiated HEP  Current : taught ex pt to incorporate in HEP       2. Patient will have improved cervical and lumbar AROM to 80% WNL to increase tolerance with transfers. Status @ Eval: guarded lumbar AROM and cervical below  Active Movements: [] N/A   [] Too acute   [] Other:  ROM % AROM % PROM Comments:pain, area   Forward flexion 50 75        Extension 30  75      SB right 30   75      SB left  30   75      Rotation right 50   75      Rotation left 50   75       3. Patient will report decreased pain to 5/10 at most with activities to be able to tolerate functional activities. Status @ Eval: 6/10 today worst 1/0/10      Long Term Goals: To be accomplished in 4 weeks:  1. Patient will be independent with HEP as PT progresses at time of discharge to be able to continue with maintenance program.  Status @ Eval: initiated  2. Patient will have increased LE strength by 1/2 grade to be able to tolerate prolonged ambulation while walking dog. Status @ Eval: Strength     L(0-5) R (0-5) N/T   Hip Flexion (L1,2) 3 3+ []    Knee Extension (L3,4) 4 4 []    Ankle Dorsiflexion (L4) 3+ 4- []    Great Toe Extension (L5)       []    Ankle Plantarflexion (S1)       []    Knee Flexion (S1,2) 3 4- []    Upper Abdominals       []    Lower Abdominals       []    Paraspinals       []    Back Rotators       []    Gluteus Darron       []    Other       []     3. Patient will have decreased pain to 3/10 at most with activities to be able to tolerate functional activities. Status@ Eval: 6/10 today, 10/10 at worst  4. Patient will have improved FOTO score by 10 points indicating improvement in functional activities.   Status @ Eval: 40          PLAN  []  Upgrade activities as tolerated     [x]  Continue plan of care  []  Update interventions per flow sheet       []  Discharge due to:_  [] Other:_      Ainsley Saul, PT, DPT 10/24/2018  10:43 AM    Future Appointments   Date Time Provider Ivan Jaqui   10/25/2018  9:00 AM Raheel Hollins PT, DPT [facility]   10/30/2018 11:00 AM Raheel Hollins PT, DPT [facility]   10/31/2018 10:30 AM Raheel Hollins PT, DPT [facility]   11/2/2018 11:00 AM Raheel Hollins PT, DPT [facility]

## 2018-10-25 ENCOUNTER — HOSPITAL ENCOUNTER (OUTPATIENT)
Dept: PHYSICAL THERAPY | Age: 38
Discharge: HOME | End: 2018-10-25
Payer: MEDICAID

## 2018-10-25 PROCEDURE — 97110 THERAPEUTIC EXERCISES: CPT | Performed by: PHYSICAL THERAPIST

## 2018-10-25 NOTE — PROGRESS NOTES
PT DAILY TREATMENT NOTE 12-16    Patient Name: Jin Riojas  Date:10/25/2018  : 1980  [x]  Patient  Verified  Payor: BLUE CROSS MEDICAID / Plan: [employer] / Product Type: Managed Care Medicaid /    In time:9:33  Out time:10:15  Total Treatment Time (min): 42  Visit #: 13 of 18    Treatment Area: Low back pain [M54.5]  Neck pain [M54.2]    SUBJECTIVE  Pain Level (0-10 scale): 3  Any medication changes, allergies to medications, adverse drug reactions, diagnosis change, or new procedure performed?: [x] No    [] Yes (see summary sheet for update)  Subjective functional status/changes:   [] No changes reported  Feels good today. No new issues. OBJECTIVE    30 min Therapeutic Exercise:  [x] See flow sheet :   Rationale: increase ROM, increase strength and decrease pain to improve the patients ability to complete ADLs    12 min Therapeutic Activity:  [x]  See flow sheet :   Rationale: increase ROM, increase strength and improve coordination  to improve the patients ability to complete ADLs             With   [] TE   [] TA   [] neuro   [] other: Patient Education: [x] Review HEP    [] Progressed/Changed HEP based on:   [] positioning   [] body mechanics   [] transfers   [] heat/ice application    [] other:      Other Objective/Functional Measures:      Pain Level (0-10 scale) post treatment: 3    ASSESSMENT/Changes in Function: Patient responds well to treatment session. Patient appears to be progressing well. No new issues. . No adverse effects were noted from today's treatment session      Patient will continue to benefit from skilled PT services to modify and progress therapeutic interventions, address functional mobility deficits, address ROM deficits, address strength deficits, analyze and address soft tissue restrictions, analyze and cue movement patterns, analyze and modify body mechanics/ergonomics and assess and modify postural abnormalities to attain remaining goals. []  See Plan of Care  []  See progress note/recertification  []  See Discharge Summary         Progress towards goals / Updated goals:      Short Term Goals: To be accomplished in 2 weeks:  1. Patient will be compliant with HEP as PT progresses to increase tolerance with functional activities. Status @ Eval: initiated HEP  Current : taught ex pt to incorporate in HEP       2. Patient will have improved cervical and lumbar AROM to 80% WNL to increase tolerance with transfers. Status @ Eval: guarded lumbar AROM and cervical below  Active Movements: [] N/A   [] Too acute   [] Other:  ROM % AROM % PROM Comments:pain, area   Forward flexion 50 75        Extension 30  75      SB right 30   75      SB left  30   75      Rotation right 50   75      Rotation left 50   75       3. Patient will report decreased pain to 5/10 at most with activities to be able to tolerate functional activities. Status @ Eval: 6/10 today worst 1/0/10      Long Term Goals: To be accomplished in 4 weeks:  1. Patient will be independent with HEP as PT progresses at time of discharge to be able to continue with maintenance program.  Status @ Eval: initiated  2. Patient will have increased LE strength by 1/2 grade to be able to tolerate prolonged ambulation while walking dog. Status @ Eval: Strength     L(0-5) R (0-5) N/T   Hip Flexion (L1,2) 3 3+ []    Knee Extension (L3,4) 4 4 []    Ankle Dorsiflexion (L4) 3+ 4- []    Great Toe Extension (L5)       []    Ankle Plantarflexion (S1)       []    Knee Flexion (S1,2) 3 4- []    Upper Abdominals       []    Lower Abdominals       []    Paraspinals       []    Back Rotators       []    Gluteus Darron       []    Other       []     3. Patient will have decreased pain to 3/10 at most with activities to be able to tolerate functional activities. Status@ Eval: 6/10 today, 10/10 at worst  4. Patient will have improved FOTO score by 10 points indicating improvement in functional activities.   Status @ Eval: 44          PLAN  []  Upgrade activities as tolerated     [x]  Continue plan of care  []  Update interventions per flow sheet       []  Discharge due to:_  []  Other:_      Yves Pichardo PT, DPT 10/25/2018  9:32 AM    Future Appointments   Date Time Provider Ivan Nails   10/30/2018 11:00 AM Tamra Johnson PT, DPT [facility]   10/31/2018 10:30 AM Tamra Johnson PT, DPT [facility]   11/2/2018 11:00 AM Tamra Johnson PT, JUANA REYNOSO [facility]

## 2018-10-30 ENCOUNTER — HOSPITAL ENCOUNTER (OUTPATIENT)
Dept: PHYSICAL THERAPY | Age: 38
Discharge: HOME | End: 2018-10-30
Payer: MEDICAID

## 2018-10-30 PROCEDURE — 97110 THERAPEUTIC EXERCISES: CPT

## 2018-10-30 PROCEDURE — 97530 THERAPEUTIC ACTIVITIES: CPT

## 2018-10-30 NOTE — PROGRESS NOTES
PT DAILY TREATMENT NOTE     Patient Name: Yun De Santiago  Date:10/30/2018  : 1980  [x]  Patient  Verified  Payor: BLUE CROSS MEDICAID / Plan: Raul Larsen / Product Type: Managed Care Medicaid /    In time:11:00  Out time:11:40  Total Treatment Time (min): 40  Visit #: 14 of 18    Treatment Area: Low back pain [M54.5]  Neck pain [M54.2]    SUBJECTIVE  Pain Level (0-10 scale): 4/10  Any medication changes, allergies to medications, adverse drug reactions, diagnosis change, or new procedure performed?: [x] No    [] Yes (see summary sheet for update)  Subjective functional status/changes:   [] No changes reported  \"I just have some pain in my low back. I had pain in my neck last night but it's better today. \"    OBJECTIVE    30 min Therapeutic Exercise:  [x] See flow sheet :   Rationale: increase ROM, increase strength and improve coordination to improve the patients ability to return to prior level of physical activity. 10 min Therapeutic Activity:  [x]  See flow sheet : lunge and squat performance   Rationale: increase ROM, increase strength and improve coordination  to improve the patients ability to return to prior level of physical activity. With   [] TE   [] TA   [] neuro   [] other: Patient Education: [x] Review HEP    [] Progressed/Changed HEP based on:   [] positioning   [] body mechanics   [] transfers   [] heat/ice application    [] other:      Other Objective/Functional Measures:      Pain Level (0-10 scale) post treatment:     ASSESSMENT/Changes in Function: Pt continues to be challenged by therex with no increased pain. Pt reports increased fatigue and slight discomfort with therex but none in neck. Pt had no adverse affects post tx.      Patient will continue to benefit from skilled PT services to modify and progress therapeutic interventions, address functional mobility deficits, address ROM deficits, address strength deficits, analyze and address soft tissue restrictions, analyze and cue movement patterns, analyze and modify body mechanics/ergonomics and assess and modify postural abnormalities to attain remaining goals. []  See Plan of Care  []  See progress note/recertification  []  See Discharge Summary         Progress towards goals / Updated goals:    Short Term Goals: To be accomplished in 2 weeks:  1. Patient will be compliant with HEP as PT progresses to increase tolerance with functional activities. Status @ Eval: initiated HEP  Current : taught ex pt to incorporate in HEP        2. Patient will have improved cervical and lumbar AROM to 80% WNL to increase tolerance with transfers. Status @ Eval: guarded lumbar AROM and cervical below  Active Movements: [] N/A   [] Too acute   [] Other:  ROM % AROM % PROM Comments:pain, area   Forward flexion 50 75        Extension 30  75      SB right 30   75      SB left  30   75      Rotation right 50   75      Rotation left 50   75       3. Patient will report decreased pain to 5/10 at most with activities to be able to tolerate functional activities. Status @ Eval: 6/10 today worst 1/0/10      Long Term Goals: To be accomplished in 4 weeks:  1. Patient will be independent with HEP as PT progresses at time of discharge to be able to continue with maintenance program.  Status @ Eval: initiated  Current: Pt reports compliance of none appointment days participation 10/30/18  2. Patient will have increased LE strength by 1/2 grade to be able to tolerate prolonged ambulation while walking dog.   Status @ Eval: Strength     L(0-5) R (0-5) N/T   Hip Flexion (L1,2) 3 3+ []    Knee Extension (L3,4) 4 4 []    Ankle Dorsiflexion (L4) 3+ 4- []    Great Toe Extension (L5)       []    Ankle Plantarflexion (S1)       []    Knee Flexion (S1,2) 3 4- []    Upper Abdominals       []    Lower Abdominals       []    Paraspinals       []    Back Rotators       []    Gluteus Darron       []    Other       []     3. Patient will have decreased pain to 3/10 at most with activities to be able to tolerate functional activities. Status@ Eval: 6/10 today, 10/10 at worst  4. Patient will have improved FOTO score by 10 points indicating improvement in functional activities.   Status @ Eval: 40             PLAN  [x]  Upgrade activities as tolerated     [x]  Continue plan of care  []  Update interventions per flow sheet       []  Discharge due to:_  []  Other:_      Kristina Coto PTA 10/30/2018  11:07 AM    Future Appointments   Date Time Provider Ivan Nails   10/31/2018 10:30 AM Kimmy Ramirez, PT, DPT [facility]   11/2/2018 11:00 AM Kimmy Ramirez PT, DPT [facility]

## 2018-10-31 ENCOUNTER — HOSPITAL ENCOUNTER (OUTPATIENT)
Dept: PHYSICAL THERAPY | Age: 38
Discharge: HOME | End: 2018-10-31
Payer: MEDICAID

## 2018-10-31 PROCEDURE — 97110 THERAPEUTIC EXERCISES: CPT | Performed by: PHYSICAL THERAPIST

## 2018-10-31 NOTE — PROGRESS NOTES
PT DAILY TREATMENT NOTE     Patient Name: Simona Ramos  Date:10/31/2018  : 1980  [x]  Patient  Verified  Payor: BLUE [place] MEDICAID / Plan: [facility] / Product Type: Managed Care Medicaid /    In time:10:35  Out time:11:12  Total Treatment Time (min): 37  Visit #: 15 of 18    Treatment Area: Low back pain [M54.5]  Neck pain [M54.2]    SUBJECTIVE  Pain Level (0-10 scale): 3  Any medication changes, allergies to medications, adverse drug reactions, diagnosis change, or new procedure performed?: [x] No    [] Yes (see summary sheet for update)  Subjective functional status/changes:   [] No changes reported  Feels alright. No new issues    OBJECTIVE    35 min Therapeutic Exercise:  [x] See flow sheet :   Rationale: increase ROM, increase strength and decrease pain to improve the patients ability to complete ADLs          With   [] TE   [] TA   [] neuro   [] other: Patient Education: [x] Review HEP    [] Progressed/Changed HEP based on:   [] positioning   [] body mechanics   [] transfers   [] heat/ice application    [] other:      Other Objective/Functional Measures:      Pain Level (0-10 scale) post treatment: 3    ASSESSMENT/Changes in Function: Patient responds well to treatment session. Patient continues to have reduced pain, however it still affects her function. No adverse effects were noted from today's treatment session      Patient will continue to benefit from skilled PT services to modify and progress therapeutic interventions, address functional mobility deficits, address ROM deficits, address strength deficits, analyze and address soft tissue restrictions, analyze and cue movement patterns, analyze and modify body mechanics/ergonomics and assess and modify postural abnormalities to attain remaining goals.      []  See Plan of Care  []  See progress note/recertification  []  See Discharge Summary         Progress towards goals / Updated goals:  Short Term Goals: To be accomplished in 2 weeks:  1. Patient will be compliant with HEP as PT progresses to increase tolerance with functional activities. Status @ Eval: initiated HEP  Current : taught ex pt to incorporate in HEP        2. Patient will have improved cervical and lumbar AROM to 80% WNL to increase tolerance with transfers. Status @ Eval: guarded lumbar AROM and cervical below  Active Movements: [] N/A   [] Too acute   [] Other:  ROM % AROM % PROM Comments:pain, area   Forward flexion 50 75        Extension 30  75      SB right 30   75      SB left  30   75      Rotation right 50   75      Rotation left 50   75       3. Patient will report decreased pain to 5/10 at most with activities to be able to tolerate functional activities. Status @ Eval: 6/10 today worst 1/0/10      Long Term Goals: To be accomplished in 4 weeks:  1. Patient will be independent with HEP as PT progresses at time of discharge to be able to continue with maintenance program.  Status @ Eval: initiated  Current: Pt reports compliance of none appointment days participation 10/30/18  2. Patient will have increased LE strength by 1/2 grade to be able to tolerate prolonged ambulation while walking dog. Status @ Eval: Strength     L(0-5) R (0-5) N/T   Hip Flexion (L1,2) 3 3+ []    Knee Extension (L3,4) 4 4 []    Ankle Dorsiflexion (L4) 3+ 4- []    Great Toe Extension (L5)       []    Ankle Plantarflexion (S1)       []    Knee Flexion (S1,2) 3 4- []    Upper Abdominals       []    Lower Abdominals       []    Paraspinals       []    Back Rotators       []    Gluteus Darron       []    Other       []     3. Patient will have decreased pain to 3/10 at most with activities to be able to tolerate functional activities. Status@ Eval: 6/10 today, 10/10 at worst  4. Patient will have improved FOTO score by 10 points indicating improvement in functional activities.   Status @ Eval: 40          PLAN  []  Upgrade activities as tolerated     [x]  Continue plan of care  []  Update interventions per flow sheet       []  Discharge due to:_  []  Other:_      Jeremy Navarro, PT, DPT 10/31/2018  10:55 AM    Future Appointments   Date Time Provider Ivan Nails   11/2/2018 11:00 AM Santo Mcgee, PT, DPT [facility]   11/5/2018 11:30 AM Taryn Silveira PTA [facility]   11/7/2018 10:00 AM Taryn Silveira, PTA [facility]   11/9/2018  9:30 AM Taryn Silveira, PTA [facility]   11/13/2018  2:00 PM Taryn Silveira PTA [facility]   11/14/2018 10:30 AM Taryn Silveira PTA [facility]   11/16/2018 11:00 AM Santo Mcgee PT, DPT [facility]

## 2018-11-02 ENCOUNTER — HOSPITAL ENCOUNTER (OUTPATIENT)
Dept: PHYSICAL THERAPY | Age: 38
Discharge: HOME | End: 2018-11-02
Payer: MEDICAID

## 2018-11-02 PROCEDURE — 97110 THERAPEUTIC EXERCISES: CPT | Performed by: PHYSICAL THERAPIST

## 2018-11-02 NOTE — PROGRESS NOTES
PT DAILY TREATMENT NOTE     Patient Name: Aniceto Hughes  Date:2018  : 1980  [x]  Patient  Verified  Payor: [employer]UE [employer] MEDICAID / Plan: [facility] [employer] / Product Type: Managed Care Medicaid /    In time:11:02  Out time:11:49  Total Treatment Time (min): 47  Visit #: 16 of 18    Treatment Area: Low back pain [M54.5]  Neck pain [M54.2]    SUBJECTIVE  Pain Level (0-10 scale): 5 (neck)  Any medication changes, allergies to medications, adverse drug reactions, diagnosis change, or new procedure performed?: [x] No    [] Yes (see summary sheet for update)  Subjective functional status/changes:   [] No changes reported  Feels alright. The neck is hurting today. No new issues. OBJECTIVE      47 min Therapeutic Exercise:  [] See flow sheet :   Rationale: increase ROM, increase strength and decrease pain to improve the patients ability to complete ADLs       With   [] TE   [] TA   [] neuro   [] other: Patient Education: [x] Review HEP    [] Progressed/Changed HEP based on:   [] positioning   [] body mechanics   [] transfers   [] heat/ice application    [] other:      Other Objective/Functional Measures:      Pain Level (0-10 scale) post treatment: 5    ASSESSMENT/Changes in Function: Patient responds well to treatment session. Progress as tolerated. No adverse effects were noted from today's treatment session      Patient will continue to benefit from skilled PT services to modify and progress therapeutic interventions, address functional mobility deficits, address ROM deficits, address strength deficits, analyze and address soft tissue restrictions, analyze and cue movement patterns, analyze and modify body mechanics/ergonomics and assess and modify postural abnormalities to attain remaining goals.      []  See Plan of Care  []  See progress note/recertification  []  See Discharge Summary         Progress towards goals / Updated goals:      Long Term Goals: To be accomplished in 4 weeks:  1. Patient will be independent with HEP as PT progresses at time of discharge to be able to continue with maintenance program.  Status @ Eval: initiated  Current: Pt reports compliance of none appointment days participation 10/30/18  2. Patient will have increased LE strength by 1/2 grade to be able to tolerate prolonged ambulation while walking dog. Status @ Eval: Strength     L(0-5) R (0-5) N/T   Hip Flexion (L1,2) 3 3+ []    Knee Extension (L3,4) 4 4 []    Ankle Dorsiflexion (L4) 3+ 4- []    Great Toe Extension (L5)       []    Ankle Plantarflexion (S1)       []    Knee Flexion (S1,2) 3 4- []    Upper Abdominals       []    Lower Abdominals       []    Paraspinals       []    Back Rotators       []    Gluteus Darron       []    Other       []     3. Patient will have decreased pain to 3/10 at most with activities to be able to tolerate functional activities. Status@ Eval: 6/10 today, 10/10 at worst  4. Patient will have improved FOTO score by 10 points indicating improvement in functional activities.   Status @ Eval: 40    PLAN  []  Upgrade activities as tolerated     [x]  Continue plan of care  []  Update interventions per flow sheet       []  Discharge due to:_  []  Other:_      Tanya Payne, PT, DPT 11/2/2018  12:05 PM    Future Appointments   Date Time Provider Ivan Nails   11/7/2018 10:00 AM INDIA PiersonHPKEITH [facility]   11/9/2018  9:30 AM INDIA PiersonHPTTOM [facility]   11/13/2018  2:00 PM Palmira REYNOSO [facility]   11/14/2018 10:30 AM INDIA PiersonHPTTOM [facility]   11/16/2018 11:00 AM Satya Minaya, PT, DPT [facility]

## 2018-11-05 ENCOUNTER — APPOINTMENT (OUTPATIENT)
Dept: PHYSICAL THERAPY | Age: 38
End: 2018-11-05
Payer: MEDICAID

## 2018-11-07 ENCOUNTER — HOSPITAL ENCOUNTER (OUTPATIENT)
Dept: PHYSICAL THERAPY | Age: 38
Discharge: HOME | End: 2018-11-07
Payer: MEDICAID

## 2018-11-07 PROCEDURE — 97110 THERAPEUTIC EXERCISES: CPT

## 2018-11-07 NOTE — PROGRESS NOTES
PT DAILY TREATMENT NOTE 12    Patient Name: Shanta Griffin  Date:2018  : 1980  [x]  Patient  Verified  Payor: BLUE CROSS MEDICAID / Plan: [facility] [place] / Product Type: Managed Care Medicaid /    In time:10:55  Out time:11:33  Total Treatment Time (min): 38  Visit #: 17 of 18    Treatment Area: Low back pain [M54.5]  Cervicalgia [M54.2]    SUBJECTIVE  Pain Level (0-10 scale): 10  Any medication changes, allergies to medications, adverse drug reactions, diagnosis change, or new procedure performed?: [x] No    [] Yes (see summary sheet for update)  Subjective functional status/changes:   [] No changes reported  \"I have some pain. I'm doing my ex at home. \"    OBJECTIVE      38 min Therapeutic Exercise:  [x] See flow sheet :   Rationale: increase ROM, increase strength and improve coordination to improve the patients ability to return to prior level of physical activity. With   [] TE   [] TA   [] neuro   [] other: Patient Education: [x] Review HEP    [] Progressed/Changed HEP based on:   [] positioning   [] body mechanics   [] transfers   [] heat/ice application    [] other:      Other Objective/Functional Measures:      Pain Level (0-10 scale) post treatment: 3/10    ASSESSMENT/Changes in Function: Pt continues to be challenged by therex with no increased pain above initial levels. Pt tolerated resistant band progression and other weight levels without pain. Pt reported no adverse affects from this tx. Patient will continue to benefit from skilled PT services to modify and progress therapeutic interventions, address functional mobility deficits, address ROM deficits, address strength deficits, analyze and address soft tissue restrictions, analyze and cue movement patterns, analyze and modify body mechanics/ergonomics and assess and modify postural abnormalities to attain remaining goals.      []  See Plan of Care  []  See progress note/recertification  []  See Discharge Summary         Progress towards goals / Updated goals:  Long Term Goals: To be accomplished in 4 weeks:  1. Patient will be independent with HEP as PT progresses at time of discharge to be able to continue with maintenance program.  Status @ Eval: initiated  Current: Pt reports compliance Progressing 11/7/18  Current: Pt reports compliance of none appointment days participation 10/30/18  2. Patient will have increased LE strength by 1/2 grade to be able to tolerate prolonged ambulation while walking dog. Status @ Eval: Strength     L(0-5) R (0-5) N/T   Hip Flexion (L1,2) 3 3+ []    Knee Extension (L3,4) 4 4 []    Ankle Dorsiflexion (L4) 3+ 4- []    Great Toe Extension (L5)       []    Ankle Plantarflexion (S1)       []    Knee Flexion (S1,2) 3 4- []    Upper Abdominals       []    Lower Abdominals       []    Paraspinals       []    Back Rotators       []    Gluteus Darron       []    Other       []     3. Patient will have decreased pain to 3/10 at most with activities to be able to tolerate functional activities. Status@ Eval: 6/10 today, 10/10 at worst  Current:   4. Patient will have improved FOTO score by 10 points indicating improvement in functional activities.   Status @ Eval: 44  Current: 56 - back, 54 - Neck Met 11/7/18          PLAN  [x]  Upgrade activities as tolerated     [x]  Continue plan of care  []  Update interventions per flow sheet       []  Discharge due to:_  []  Other:_      René Bowman PTA 11/7/2018  11:45 AM    Future Appointments   Date Time Provider Ivan Nails   11/9/2018  9:30 AM INDIA Hernandez [facility]   11/13/2018  2:00 PM Stevo REYNOSO [facility]   11/14/2018 10:30 AM INDIA Hernandez [facility]   11/16/2018 11:00 AM Garry Quintero, PT, DPT [facility]

## 2018-11-09 ENCOUNTER — HOSPITAL ENCOUNTER (OUTPATIENT)
Dept: PHYSICAL THERAPY | Age: 38
Discharge: HOME | End: 2018-11-09
Payer: MEDICAID

## 2018-11-09 PROCEDURE — 97110 THERAPEUTIC EXERCISES: CPT

## 2018-11-09 NOTE — PROGRESS NOTES
PT DAILY TREATMENT NOTE 12    Patient Name: Ezequiel Koehler  Date:2018  : 1980  [x]  Patient  Verified  Payor: BLUE CROSS MEDICAID / Plan: [facility] / Product Type: Managed Care Medicaid /    In time:9:34  Out time:10:14  Total Treatment Time (min): 40  Visit #: 18 of 18    Treatment Area: Low back pain [M54.5]  Cervicalgia [M54.2]    SUBJECTIVE  Pain Level (0-10 scale): 7/10  Any medication changes, allergies to medications, adverse drug reactions, diagnosis change, or new procedure performed?: [x] No    [] Yes (see summary sheet for update)  Subjective functional status/changes:   [] No changes reported  \"I walked three miles yesterday to get home and now I've got some pain today. \"    OBJECTIVE    40 min Therapeutic Exercise:  [x] See flow sheet :   Rationale: increase ROM, increase strength and improve coordination to improve the patients ability to return to prior level of physical activity. With   [] TE   [] TA   [] neuro   [] other: Patient Education: [x] Review HEP    [] Progressed/Changed HEP based on:   [] positioning   [] body mechanics   [] transfers   [] heat/ice application    [] other:      Other Objective/Functional Measures:      Pain Level (0-10 scale) post treatment: 7/10    ASSESSMENT/Changes in Function: Pt arrived in clinic with high levels of pain today. Pt very challenged by LE therex and therefore tx was focused primarily on thoracic and neck. Pt reported continued pain post tx but overall decreased stiffness. Patient will continue to benefit from skilled PT services to modify and progress therapeutic interventions, address functional mobility deficits, address ROM deficits, address strength deficits, analyze and address soft tissue restrictions, analyze and cue movement patterns, analyze and modify body mechanics/ergonomics and assess and modify postural abnormalities to attain remaining goals.      []  See Plan of Care  []  See progress note/recertification  []  See Discharge Summary         Progress towards goals / Updated goals:  Long Term Goals: To be accomplished in 4 weeks:  1. Patient will be independent with HEP as PT progresses at time of discharge to be able to continue with maintenance program.  Status @ Eval: initiated  Current: Pt reports compliance Progressing 11/7/18  Current: Pt reports compliance of none appointment days participation 10/30/18  2. Patient will have increased LE strength by 1/2 grade to be able to tolerate prolonged ambulation while walking dog. Status @ Eval: Strength     L(0-5) R (0-5) N/T   Hip Flexion (L1,2) 3 3+ []    Knee Extension (L3,4) 4 4 []    Ankle Dorsiflexion (L4) 3+ 4- []    Great Toe Extension (L5)       []    Ankle Plantarflexion (S1)       []    Knee Flexion (S1,2) 3 4- []    Upper Abdominals       []    Lower Abdominals       []    Paraspinals       []    Back Rotators       []    Gluteus Darron       []    Other       []     3. Patient will have decreased pain to 3/10 at most with activities to be able to tolerate functional activities. Status@ Eval: 6/10 today, 10/10 at worst  Current:   4. Patient will have improved FOTO score by 10 points indicating improvement in functional activities.   Status @ Eval: 44  Current: 56 - back, 54 - Neck Met 11/7/18             PLAN  [x]  Upgrade activities as tolerated     [x]  Continue plan of care  []  Update interventions per flow sheet       []  Discharge due to:_  []  Other:_      Tamara Gore PTA 11/9/2018  10:26 AM    Future Appointments   Date Time Provider Ivan Nails   11/13/2018  2:00 PM Danniel Habermann [facility]   11/14/2018 10:30 AM INDIA Farris [facility]   11/16/2018 11:00 AM Jairo Guzmán PT, DPT [facility]

## 2018-11-13 ENCOUNTER — HOSPITAL ENCOUNTER (OUTPATIENT)
Dept: PHYSICAL THERAPY | Age: 38
Discharge: HOME | End: 2018-11-13
Payer: MEDICAID

## 2018-11-13 PROCEDURE — 97110 THERAPEUTIC EXERCISES: CPT

## 2018-11-13 NOTE — PROGRESS NOTES
PT DAILY TREATMENT NOTE     Patient Name: Rush Braxton  Date:2018  : 1980  [x]  Patient  Verified  Payor: BLUE [employer]OSS MEDICAID / Plan: [place] / Product Type: Managed Care Medicaid /    In time:2:02  Out time:2:40  Total Treatment Time (min): 38  Visit #: 19 of 19    Treatment Area: Low back pain [M54.5]  Cervicalgia [M54.2]    SUBJECTIVE   Pain Level (0-10 scale): 10  Any medication changes, allergies to medications, adverse drug reactions, diagnosis change, or new procedure performed?: [x] No    [] Yes (see summary sheet for update)  Subjective functional status/changes:   [] No changes reported  \"I have pain but it's not as bad as it's been. \"    OBJECTIVE    38 min Therapeutic Exercise:  [x] See flow sheet :   Rationale: increase ROM, increase strength and improve coordination to improve the patients ability to return to prior level of physical activity. With   [] TE   [] TA   [] neuro   [] other: Patient Education: [x] Review HEP    [] Progressed/Changed HEP based on:   [] positioning   [] body mechanics   [] transfers   [] heat/ice application    [] other:      Other Objective/Functional Measures:      Pain Level (0-10 scale) post treatment: 4/10    ASSESSMENT/Changes in Function: Pt continues to be challenged by therex. Pt reports slight decreased pain post tx. Pt shows increased strength in B hips but continued weakness. Patient will continue to benefit from skilled PT services to modify and progress therapeutic interventions, address functional mobility deficits, address ROM deficits, address strength deficits, analyze and address soft tissue restrictions, analyze and cue movement patterns, analyze and modify body mechanics/ergonomics and assess and modify postural abnormalities to attain remaining goals.      []  See Plan of Care  []  See progress note/recertification  []  See Discharge Summary         Progress towards goals / Updated goals:  Long Term Goals: To be accomplished in 4 weeks:  1. Patient will be independent with HEP as PT progresses at time of discharge to be able to continue with maintenance program.  Status @ Eval: initiated  Current: Pt reports compliance Progressing 11/7/18  Current: Pt reports compliance of none appointment days participation 10/30/18  2. Patient will have increased LE strength by 1/2 grade to be able to tolerate prolonged ambulation while walking dog. Status @ Eval: Strength     L(0-5) R (0-5) N/T   Hip Flexion (L1,2) 3 3+ []    Knee Extension (L3,4) 4 4 []    Ankle Dorsiflexion (L4) 3+ 4- []    Great Toe Extension (L5)       []    Ankle Plantarflexion (S1)       []    Knee Flexion (S1,2) 3 4- []    Upper Abdominals       []    Lower Abdominals       []    Paraspinals       []    Back Rotators       []    Gluteus Darron       []    Other       []    Current: Hip Flex: B: 4/5 Abd: B: 5/5, Ext: B 4+/5     3. Patient will have decreased pain to 3/10 at most with activities to be able to tolerate functional activities. Status@ Eval: 6/10 today, 10/10 at worst  Current: 8/10 in past week 11/13/18  4. Patient will have improved FOTO score by 10 points indicating improvement in functional activities.   Status @ Eval: 44  Current: 56 - back, 54 - Neck Met 11/7/18                PLAN  [x]  Upgrade activities as tolerated     [x]  Continue plan of care  []  Update interventions per flow sheet       []  Discharge due to:_  []  Other:_      Ck Colunga PTA 11/13/2018  2:48 PM    Future Appointments   Date Time Provider Ivan Nails   11/14/2018 10:30 AM Derian REYNOSO [facility]   11/16/2018 11:00 AM Lulu Osborn, PT, DPT [facility]

## 2018-11-14 ENCOUNTER — HOSPITAL ENCOUNTER (OUTPATIENT)
Dept: PHYSICAL THERAPY | Age: 38
Discharge: HOME | End: 2018-11-14
Payer: MEDICAID

## 2018-11-14 PROCEDURE — 97110 THERAPEUTIC EXERCISES: CPT

## 2018-11-16 ENCOUNTER — HOSPITAL ENCOUNTER (OUTPATIENT)
Dept: PHYSICAL THERAPY | Age: 38
Discharge: HOME | End: 2018-11-16
Payer: MEDICAID

## 2018-11-16 PROCEDURE — 97110 THERAPEUTIC EXERCISES: CPT | Performed by: PHYSICAL THERAPIST

## 2018-11-16 NOTE — PROGRESS NOTES
PT DAILY TREATMENT NOTE     Patient Name: Jan Fam  Date:2018  : 1980  [x]  Patient  Verified  Payor: [employer]UE [employer] MEDICAID / [place] [facility] [employer] / Product Type: Managed Care Medicaid /    In time:11:00  Out time:12:10  Total Treatment Time (min): 70  Visit #: 21 of 27    Treatment Area: Low back pain [M54.5]  Cervicalgia [M54.2]    SUBJECTIVE  Pain Level (0-10 scale): 7  Any medication changes, allergies to medications, adverse drug reactions, diagnosis change, or new procedure performed?: [x] No    [] Yes (see summary sheet for update)  Subjective functional status/changes:   [] No changes reported  Continues to have pain, but is getting better    OBJECTIVE    70 min Therapeutic Exercise:  [x] See flow sheet :   Rationale: increase ROM, increase strength and decrease pain to improve the patients ability to complete ADLs              With   [] TE   [] TA   [] neuro   [] other: Patient Education: [x] Review HEP    [] Progressed/Changed HEP based on:   [] positioning   [] body mechanics   [] transfers   [] heat/ice application    [] other:      Other Objective/Functional Measures:  No directional preference for LE radicular pain    Pain Level (0-10 scale) post treatment: 7-8    ASSESSMENT/Changes in Function: Patient responds well to treatment session. Patient has made good progress to date. Continues to have radicular symptoms into L LE, but right UE is mostly resolved only occasional symptoms. Low back and neck pain continue to affect daily function and activity tolerance.  No adverse effects were noted from today's treatment session      Patient will continue to benefit from skilled PT services to modify and progress therapeutic interventions, address functional mobility deficits, address ROM deficits, address strength deficits, analyze and address soft tissue restrictions, analyze and cue movement patterns, analyze and modify body mechanics/ergonomics, assess and modify postural abnormalities     [x]  See Plan of Care  []  See progress note/recertification  []  See Discharge Summary         Progress towards goals / Updated goals:  Long Term Goals: To be accomplished in 4 weeks:  1. Patient will be independent with HEP as PT progresses at time of discharge to be able to continue with maintenance program.  Status @ Eval: initiated  Current: Pt reports compliance Progressing 11/7/18  Current: Pt reports compliance of none appointment days participation 10/30/18  2. Patient will have increased LE strength by 1/2 grade to be able to tolerate prolonged ambulation while walking dog. Status @ Eval: Strength     L(0-5) R (0-5) N/T   Hip Flexion (L1,2) 3 3+ []    Knee Extension (L3,4) 4 4 []    Ankle Dorsiflexion (L4) 3+ 4- []    Great Toe Extension (L5)       []    Ankle Plantarflexion (S1)       []    Knee Flexion (S1,2) 3 4- []    Upper Abdominals       []    Lower Abdominals       []    Paraspinals       []    Back Rotators       []    Gluteus Darron       []    Other       []    Current: Hip Flex: B: 4/5 Abd: B: 5/5, Ext: B 4+/5      3. Patient will have decreased pain to 3/10 at most with activities to be able to tolerate functional activities. Status@ Eval: 6/10 today, 10/10 at worst  Current: 8/10 in past week 11/13/18  4. Patient will have improved FOTO score by 10 points indicating improvement in functional activities. Status @ Eval: 44  Current: 56 - back, 54 - Neck Met 11/7/18          PLAN  []  Upgrade activities as tolerated     []  Continue plan of care  []  Update interventions per flow sheet       []  Discharge due to:_  []  Other:_      Jeremy Navarro, PT, DPT 11/16/2018  12:10 PM    No future appointments.

## 2018-11-16 NOTE — PROGRESS NOTES
[facility] at [address] [place] [address]  Phone: [phone]   Fax: [phone]    Progress Note  Patient name: Josias Vieyra Start of Care: 2018   Referral source: Milton Ortez MD : 1980   Medical/Treatment Diagnosis: Low back pain [M54.5]  Cervicalgia [M54.2] Onset Date:chronic, worse in 2018     Prior Hospitalization: see medical history Provider#: [id number]   Medications: Verified on Patient Summary List    Comorbidities: OA  Prior Level of Function:independent w/ ADLs    Visits from Start of Care: 21    Missed Visits: 2    Established Goals:          Excellent Good         Limited           None  [x] Increased ROM   []  [x]  []  []  [x] Increased Strength  []  [x]  []  []  [x] Increased Mobility  []  [x]  []  []   [] Decreased Pain   []  []  []  []  [] Decreased Swelling  []  []  []  []    Key Functional Changes: increased ROM, decreased pain  Updated Goals: to be achieved in 4 weeks:    Long Term Goals: To be accomplished in 4 weeks:   Patient will increase B LE strength to 5/5 MMT throughout to aid in completion of ADLs. Status at PN: 4/5 in hips   Current:     Patient will report pain no greater than 3/10 throughout entire day to aid in completion of ADLs. Status at PN:7/10   Current:     Patent will be able to sit for 30mins to be able to drive to appointments and complete ADLs. Status at PN: pain with sitting greater than several minutes   Current:      ASSESSMENT/RECOMMENDATIONS:  atient responds well to treatment session. Patient has made good progress to date. Continues to have radicular symptoms into L LE, but right UE is mostly resolved only occasional symptoms. Low back and neck pain continue to affect daily function and activity tolerance.  No adverse effects were noted from today's treatment session        Patient will continue to benefit from skilled PT services to modify and progress therapeutic interventions, address functional mobility deficits, address ROM deficits, address strength deficits, analyze and address soft tissue restrictions, analyze and cue movement patterns, analyze and modify body mechanics/ergonomics, assess and modify postural abnormalities      []Continue therapy per initial plan/protocol at a frequency of  2 x per week for 4 weeks  []Continue therapy with the following recommended changes:_____________________      _____________________________________________________________________  []Discontinue therapy progressing towards or have reached established goals  []Discontinue therapy due to lack of appreciable progress towards goals  []Discontinue therapy due to lack of attendance or compliance  []Await Physician's recommendations/decisions regarding therapy  []Other:________________________________________________________________    Thank you for this referral.   Jojo Guo PT, DPT 11/16/2018 12:31 PM  NOTE TO PHYSICIAN:  PLEASE COMPLETE THE ORDERS BELOW AND   FAX TO [facility]: [phone]  If you are unable to process this request in 24 hours please contact our office:   [phone]  []  I have read the above report and request that my patient continue as recommended. []  I have read the above report and request that my patient continue therapy with the following changes/special instructions:________________________________________  []I have read the above report and request that my patient be discharged from therapy.     Physicians signature: ______________________________Date: ______Time:______

## 2018-11-27 ENCOUNTER — HOSPITAL ENCOUNTER (OUTPATIENT)
Dept: PHYSICAL THERAPY | Age: 38
Discharge: HOME | End: 2018-11-27
Payer: MEDICAID

## 2018-11-27 PROCEDURE — 97110 THERAPEUTIC EXERCISES: CPT

## 2018-11-27 NOTE — PROGRESS NOTES
PT DAILY TREATMENT NOTE     Patient Name: Lauri Barron  Date:2018  : 1980  [x]  Patient  Verified  Payor: BLUE CROSS MEDICAID / Plan: [employer] / Product Type: Managed Care Medicaid /    In time:2:28  Out time:3:16  Total Treatment Time (min): 48  Visit #: 22  30    Treatment Area: Low back pain [M54.5]  Cervicalgia [M54.2]    SUBJECTIVE  Pain Level (0-10 scale): 10  Any medication changes, allergies to medications, adverse drug reactions, diagnosis change, or new procedure performed?: [x] No    [] Yes (see summary sheet for update)  Subjective functional status/changes:   [] No changes reported  \"I have pain in all different spots sometimes. \"    OBJECTIVE      48 min Therapeutic Exercise:  [x] See flow sheet :   Rationale: increase ROM, increase strength and improve coordination to improve the patients ability to perform ADLs with less pain. With   [] TE   [] TA   [] neuro   [] other: Patient Education: [x] Review HEP    [] Progressed/Changed HEP based on:   [] positioning   [] body mechanics   [] transfers   [] heat/ice application    [] other:      Other Objective/Functional Measures:      Pain Level (0-10 scale) post treatment: 4/10    ASSESSMENT/Changes in Function: Pt continues to report instances of widespread pain at end of day. Pt tolerated therex session with no increased pain. Pt reported lack of participation in HEP over the break. Pt encouraged to participate in HEP to received maximum benefit from tx. Patient will continue to benefit from skilled PT services to modify and progress therapeutic interventions, address functional mobility deficits, address ROM deficits, address strength deficits, analyze and address soft tissue restrictions, analyze and cue movement patterns, analyze and modify body mechanics/ergonomics and assess and modify postural abnormalities to attain remaining goals.      []  See Plan of Care  []  See progress note/recertification  []  See Discharge Summary         Progress towards goals / Updated goals:  Long Term Goals: To be accomplished in 4 weeks:              Patient will increase B LE strength to 5/5 MMT throughout to aid in completion of ADLs. Status at PN: 4/5 in hips              Current:                 Patient will report pain no greater than 3/10 throughout entire day to aid in completion of ADLs. Status at PN:7/10              Current:                 Patent will be able to sit for 30mins to be able to drive to appointments and complete ADLs.               Status at PN: pain with sitting greater than several minutes              Current:           PLAN  [x]  Upgrade activities as tolerated     [x]  Continue plan of care  []  Update interventions per flow sheet       []  Discharge due to:_  []  Other:_      Blaire Fletcher PTA 11/27/2018  2:34 PM    Future Appointments   Date Time Provider Ivan Nails   11/30/2018 10:30 AM INDIA Chen [facility]   12/4/2018 11:00 AM Julius Andersen PT, DPT [facility]   12/7/2018 11:00 AM INDIA ChenHPKEITH THE [facility]   12/11/2018  2:00 PM INDIA ChenHPKEITH THE [facility]   12/13/2018 11:00 AM Mike Mcallister, PT [facility]

## 2018-11-29 ENCOUNTER — APPOINTMENT (OUTPATIENT)
Dept: PHYSICAL THERAPY | Age: 38
End: 2018-11-29
Payer: MEDICAID

## 2018-11-30 ENCOUNTER — APPOINTMENT (OUTPATIENT)
Dept: PHYSICAL THERAPY | Age: 38
End: 2018-11-30
Payer: MEDICAID

## 2018-12-04 ENCOUNTER — HOSPITAL ENCOUNTER (OUTPATIENT)
Dept: PHYSICAL THERAPY | Age: 38
Discharge: HOME | End: 2018-12-04
Payer: MEDICAID

## 2018-12-04 PROCEDURE — 97110 THERAPEUTIC EXERCISES: CPT | Performed by: PHYSICAL THERAPIST

## 2018-12-04 NOTE — PROGRESS NOTES
PT DAILY TREATMENT NOTE     Patient Name: Neris Sterling  Date:2018  : 1980  [x]  Patient  Verified  Payor: BLUE CROSS MEDICAID / Plan: Sury Mccannper / Product Type: Managed Care Medicaid /    In time:10:55  Out time:11:50  Total Treatment Time (min): 55  Visit #:  30    Treatment Area: Low back pain [M54.5]  Cervicalgia [M54.2]    SUBJECTIVE  Pain Level (0-10 scale): 4  Any medication changes, allergies to medications, adverse drug reactions, diagnosis change, or new procedure performed?: [x] No    [] Yes (see summary sheet for update)  Subjective functional status/changes:   [] No changes reported  Feels good. Wants to progress and lift weights. States she needs to get stronger so she can go hiking in [address] [place] [address]    55 min Therapeutic Exercise:  [x] See flow sheet :   Rationale: increase ROM, increase strength and decrease pain to improve the patients ability to complete ADLs          With   [] TE   [] TA   [] neuro   [] other: Patient Education: [x] Review HEP    [] Progressed/Changed HEP based on:   [] positioning   [] body mechanics   [] transfers   [] heat/ice application    [] other:      Other Objective/Functional Measures:      Pain Level (0-10 scale) post treatment: 5/10    ASSESSMENT/Changes in Function: Patient responds well to treatment session. Demonstrated some exercises in the weight room. Advised patient to follow her physician's recommendations as she understands them. Educated on benefits of resistance exercises and the SAID principle.  No adverse effects were noted from today's treatment session      Patient will continue to benefit from skilled PT services to modify and progress therapeutic interventions, address functional mobility deficits, address ROM deficits, address strength deficits, analyze and address soft tissue restrictions, analyze and cue movement patterns, analyze and modify body mechanics/ergonomics, assess and modify postural abnormalities,     []  See Plan of Care  []  See progress note/recertification  []  See Discharge Summary         Progress towards goals / Updated goals:  Long Term Goals: To be accomplished in 4 weeks:              Patient will increase B LE strength to 5/5 MMT throughout to aid in completion of ADLs.             UIHYQM at PN: 4/5 in hips              Current:                 Patient will report pain no greater than 3/10 throughout entire day to aid in completion of ADLs.             SUPEHO at PN:7/10              Current:                 Patent will be able to sit for 30mins to be able to drive to appointments and complete ADLs.               UTKDNC at PN: pain with sitting greater than several minutes              Current:        PLAN  []  Upgrade activities as tolerated     [x]  Continue plan of care  []  Update interventions per flow sheet       []  Discharge due to:_  []  Other:_      Farhana Brown, PT, DPT 12/4/2018  11:08 AM    Future Appointments   Date Time Provider Ivan Nails   12/7/2018 11:00 AM Sisi Dubin, [place] [facility]   12/11/2018  2:00 PM Betsy IZAGUIRREHPTTOM THE [facility]   12/13/2018 11:00 AM Rashawn Mcallister Officer, PT [facility]

## 2018-12-07 ENCOUNTER — HOSPITAL ENCOUNTER (OUTPATIENT)
Dept: PHYSICAL THERAPY | Age: 38
Discharge: HOME | End: 2018-12-07
Payer: MEDICAID

## 2018-12-07 PROCEDURE — 97110 THERAPEUTIC EXERCISES: CPT

## 2018-12-07 NOTE — PROGRESS NOTES
PT DAILY TREATMENT NOTE     Patient Name: Ella Castaneda  Date:2018  : 1980  [x]  Patient  Verified  Payor: BLUE CROSS MEDICAID / Plan: [place] [employer] Guzman Rashi / Product Type: Managed Care Medicaid /    In time:11:05  Out time:11:46  Total Treatment Time (min): 41  Visit #: 24 of 30    Treatment Area: Low back pain [M54.5]  Cervicalgia [M54.2]    SUBJECTIVE  Pain Level (0-10 scale): 4/10 neck and back  Any medication changes, allergies to medications, adverse drug reactions, diagnosis change, or new procedure performed?: [x] No    [] Yes (see summary sheet for update)  Subjective functional status/changes:   [] No changes reported  \"My neck has been bothering me a lot the past couple days. It's ok today. \"    OBJECTIVE      41 min Therapeutic Exercise:  [x] See flow sheet :   Rationale: increase ROM, increase strength and improve coordination to improve the patients ability to return to prior level of physical activity. With   [] TE   [] TA   [] neuro   [] other: Patient Education: [x] Review HEP    [] Progressed/Changed HEP based on:   [] positioning   [] body mechanics   [] transfers   [] heat/ice application    [] other:      Other Objective/Functional Measures:      Pain Level (0-10 scale) post treatment: 4/10 neck and back    ASSESSMENT/Changes in Function: Pt continues to be challenged by therex. Pt reported more discomfort with ex today and reported pt would perform bridges at home with HEP. Pt reported no change in pain levels post tx but increased fatigue. Patient will continue to benefit from skilled PT services to modify and progress therapeutic interventions, address functional mobility deficits, address ROM deficits, address strength deficits, analyze and address soft tissue restrictions, analyze and cue movement patterns, analyze and modify body mechanics/ergonomics and assess and modify postural abnormalities to attain remaining goals.      []  See Plan of Care  []  See progress note/recertification  []  See Discharge Summary         Progress towards goals / Updated goals:  Long Term Goals: To be accomplished in 4 weeks:              Patient will increase B LE strength to 5/5 MMT throughout to aid in completion of ADLs.             XKRAXM at PN: 4/5 in hips              Current:                 Patient will report pain no greater than 3/10 throughout entire day to aid in completion of ADLs.             ZKRHSU at PN:7/10              Current:                 Patent will be able to sit for 30mins to be able to drive to appointments and complete ADLs.               MFNZBN at PN: pain with sitting greater than several minutes              Current:           PLAN  [x]  Upgrade activities as tolerated     [x]  Continue plan of care  []  Update interventions per flow sheet       []  Discharge due to:_  []  Other:_      Desmond Devi PTA 12/7/2018  11:20 AM    Future Appointments   Date Time Provider Ivan Nails   12/11/2018  2:00 PM Lizbet REYNOSO THE [facility]   12/13/2018 11:00 AM INDIA Thomas THE [facility]

## 2018-12-11 ENCOUNTER — HOSPITAL ENCOUNTER (OUTPATIENT)
Dept: PHYSICAL THERAPY | Age: 38
Discharge: HOME | End: 2018-12-11
Payer: MEDICAID

## 2018-12-11 PROCEDURE — 97110 THERAPEUTIC EXERCISES: CPT
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
## 2018-12-13 ENCOUNTER — HOSPITAL ENCOUNTER (OUTPATIENT)
Dept: PHYSICAL THERAPY | Age: 38
Discharge: HOME | End: 2018-12-13
Payer: MEDICAID

## 2018-12-13 PROCEDURE — 97110 THERAPEUTIC EXERCISES: CPT

## 2018-12-13 NOTE — PROGRESS NOTES
PT DAILY TREATMENT NOTE     Patient Name: Earnest May  Date:2018  : 1980  [x]  Patient  Verified  Payor: BLUE CROSS MEDICAID / Plan: Shanice Cage / Product Type: Managed Care Medicaid /    In time:11:00  Out time:11:48  Total Treatment Time (min): 48  Visit #: 26 of 30    Treatment Area: Low back pain [M54.5]  Cervicalgia [M54.2]    SUBJECTIVE  Pain Level (0-10 scale): 2/10 neck, 1/10 back  Any medication changes, allergies to medications, adverse drug reactions, diagnosis change, or new procedure performed?: [x] No    [] Yes (see summary sheet for update)  Subjective functional status/changes:   [] No changes reported  \"I actually feel good today. \"    OBJECTIVE      48 min Therapeutic Exercise:  [x] See flow sheet :   Rationale: increase ROM, increase strength and improve coordination to improve the patients ability to return to prior level of physical activity. With   [] TE   [] TA   [] neuro   [] other: Patient Education: [x] Review HEP    [] Progressed/Changed HEP based on:   [] positioning   [] body mechanics   [] transfers   [] heat/ice application    [] other:      Other Objective/Functional Measures:      Pain Level (0-10 scale) post treatment: 2/10 back neck    ASSESSMENT/Changes in Function: Pt continues to progress with ex, increasing resistance levels for UE ex. Pt performed full dynamic warm-up with increased fatigue. Pt reports improvements with sitting tolerance up to 30 minutes. Pt reports confidence in continuing progression of therex and therefore will transition over next few visits to HEP and D/C per pt. Will discuss with pt further next visit.     Patient will continue to benefit from skilled PT services to modify and progress therapeutic interventions, address functional mobility deficits, address ROM deficits, address strength deficits, analyze and address soft tissue restrictions, analyze and cue movement patterns, analyze and modify body mechanics/ergonomics and assess and modify postural abnormalities to attain remaining goals. []  See Plan of Care  []  See progress note/recertification  []  See Discharge Summary         Progress towards goals / Updated goals:  Long Term Goals: To be accomplished in 4 weeks:              Patient will increase B LE strength to 5/5 MMT throughout to aid in completion of ADLs.             MYOJCL at PN: 4/5 in hips              Current:                 Patient will report pain no greater than 3/10 throughout entire day to aid in completion of ADLs.             NRNXMJ at PN:7/10              Current:7/10 at worst in the past week 12/13/18                 Patent will be able to sit for 30mins to be able to drive to appointments and complete ADLs.               WPBUPW at PN: pain with sitting greater than several minutes              Current: Pt reports no increased pain for sitting less than 30 min 12/13/18              PLAN  [x]  Upgrade activities as tolerated     [x]  Continue plan of care  []  Update interventions per flow sheet       []  Discharge due to:_  []  Other:_      Idalia Pino PTA 12/13/2018  11:17 AM    Future Appointments   Date Time Provider Ivan Nails   12/19/2018 10:00 AM INDIA An THE [facility]   12/21/2018 10:00 AM INDIA An THE [facility] 64F pmh bst CA, htn, s/p lumpectomy here for PST for scheduled Left total hip replacement

## 2018-12-19 ENCOUNTER — HOSPITAL ENCOUNTER (OUTPATIENT)
Dept: PHYSICAL THERAPY | Age: 38
Discharge: HOME | End: 2018-12-19
Payer: MEDICAID

## 2018-12-19 PROCEDURE — 97110 THERAPEUTIC EXERCISES: CPT

## 2018-12-19 NOTE — PROGRESS NOTES
PT DAILY TREATMENT NOTE     Patient Name: Tonny Cheek  Date:2018  : 1980  [x]  Patient  Verified  Payor: BLUE CROSS MEDICAID / Plan: Samantha Thompson / Product Type: Managed Care Medicaid /    In time:10:00  Out time:10:40  Total Treatment Time (min): 40  Visit #:  30    Treatment Area: Low back pain [M54.5]  Cervicalgia [M54.2]    SUBJECTIVE  Pain Level (0-10 scale): 3/10 neck, 4/10 back  Any medication changes, allergies to medications, adverse drug reactions, diagnosis change, or new procedure performed?: [x] No    [] Yes (see summary sheet for update)  Subjective functional status/changes:   [] No changes reported  \"I just have some pain in my back and neck. So today I start with the [facility] for continuing to work out. I think I'll go today and if I feel good I'll just make  \"    OBJECTIVE      40 min Therapeutic Exercise:  [x] See flow sheet :   Rationale: increase ROM, increase strength and improve coordination to improve the patients ability to return to prior level of physical activity. With   [] TE   [] TA   [] neuro   [] other: Patient Education: [x] Review HEP    [] Progressed/Changed HEP based on:   [] positioning   [] body mechanics   [] transfers   [] heat/ice application    [] other:      Other Objective/Functional Measures:      Pain Level (0-10 scale) post treatment: 1/10    ASSESSMENT/Changes in Function: Pt reports beginning exercise regiment with  in gym. Pt reports they have reached a level where they can continue with exercise oustide of clinic would like to discharge after beginning with . Pt will attend final visit for updated HEP and discharge procedures.      Patient will continue to benefit from skilled PT services to modify and progress therapeutic interventions, address functional mobility deficits, address ROM deficits, address strength deficits, analyze and address soft tissue restrictions, analyze and cue movement patterns, analyze and modify body mechanics/ergonomics and assess and modify postural abnormalities to attain remaining goals. []  See Plan of Care  []  See progress note/recertification  []  See Discharge Summary         Progress towards goals / Updated goals:  Long Term Goals: To be accomplished in 4 weeks:              Patient will increase B LE strength to 5/5 MMT throughout to aid in completion of ADLs.             KGDVYF at PN: 4/5 in hips              Current:                 Patient will report pain no greater than 3/10 throughout entire day to aid in completion of ADLs.             ANESGB at PN:7/10              Current:7/10 at worst in the past week 12/13/18                 Patent will be able to sit for 30mins to be able to drive to appointments and complete ADLs.               HOWBKK at PN: pain with sitting greater than several minutes              Current: Pt reports no increased pain for sitting less than 30 min 12/13/18                 PLAN  [x]  Upgrade activities as tolerated     [x]  Continue plan of care  []  Update interventions per flow sheet       []  Discharge due to:_  []  Other:_      Genet Plata PTA 12/19/2018  10:09 AM    Future Appointments   Date Time Provider Ivan Nails   12/21/2018 10:00 AM Taryn Silveira PTA [facility]

## 2018-12-21 ENCOUNTER — HOSPITAL ENCOUNTER (OUTPATIENT)
Dept: PHYSICAL THERAPY | Age: 38
Discharge: HOME | End: 2018-12-21
Payer: MEDICAID

## 2018-12-21 PROCEDURE — 97110 THERAPEUTIC EXERCISES: CPT

## 2018-12-21 NOTE — PROGRESS NOTES
PT DAILY TREATMENT NOTE     Patient Name: Kemar Jett  Date:2018  : 1980  [x]  Patient  Verified  Payor: BLUE CROSS MEDICAID / Plan: Jarrod Denny / Product Type: Managed Care Medicaid /    In time:10:02  Out time:10:50  Total Treatment Time (min): 48  Visit #:  30    Treatment Area: Low back pain [M54.5]  Cervicalgia [M54.2]    SUBJECTIVE  Pain Level (0-10 scale): 0/10  Any medication changes, allergies to medications, adverse drug reactions, diagnosis change, or new procedure performed?: [x] No    [] Yes (see summary sheet for update)  Subjective functional status/changes:   [] No changes reported  \"I don't have any pain with ex. I did the workout with the Y  and it went good. I was sore. I think I'll be ok to keep going on my own now. \"    OBJECTIVE      48 min Therapeutic Exercise:  [x] See flow sheet :   Rationale: increase ROM, increase strength and improve coordination to improve the patients ability to return to prior level of physical activity. With   [] TE   [] TA   [] neuro   [] other: Patient Education: [x] Review HEP    [] Progressed/Changed HEP based on:   [] positioning   [] body mechanics   [] transfers   [] heat/ice application    [] other:      Other Objective/Functional Measures:      Pain Level (0-10 scale) post treatment: 3/10 back, neck    ASSESSMENT/Changes in Function: Pt able to perform HEP ex with no increased pain above initial levels. Pt has progressed towards goals well and would continue to benefit from strengthening ex. Pt reports at this time, pt feels confident in ability to continue with HEP and Y  assistance to perform strength training and ex post therapy. Pt given updated HEP in order to achieve maximum benefit post therapy. Pt is D/C at this time due to meeting acceptable subjective state in which pt is able  to work towards goals on own.      Patient will continue to benefit from skilled PT services to modify and progress therapeutic interventions, address functional mobility deficits, address ROM deficits, address strength deficits, analyze and address soft tissue restrictions, analyze and cue movement patterns, analyze and modify body mechanics/ergonomics and assess and modify postural abnormalities to attain remaining goals. []  See Plan of Care  []  See progress note/recertification  []  See Discharge Summary         Progress towards goals / Updated goals:  Long Term Goals: To be accomplished in 4 weeks:              Patient will increase B LE strength to 5/5 MMT throughout to aid in completion of ADLs.             FSGLQE at PN: 4/5 in hips              Current: 4 to 4+/5 in Left LE and 4+ to 5/5 in Right LE 12/21/18 Progressing                 Patient will report pain no greater than 3/10 throughout entire day to aid in completion of ADLs.             QXGXUA at PN:7/10              Current:6/10 at worst but 4/10 on average 12/21/18 Progressing                 Patent will be able to sit for 30mins to be able to drive to appointments and complete ADLs.             DHXPHE at PN: pain with sitting greater than several minutes              Current: Pt reports no increased pain for sitting less than 30 min 12/21/18 Progressing           PLAN  [x]  Upgrade activities as tolerated     [x]  Continue plan of care  []  Update interventions per flow sheet       []  Discharge due to:_  []  Other:_      Charlie Kennedy PTA 12/21/2018  10:10 AM    No future appointments.

## 2019-01-04 NOTE — PROGRESS NOTES
[facility] at [address] [place] [address]  Phone: [phone]   Fax: [phone]    Discharge Summary    Patient name: Shanta Griffin     Start of Care: 2018  Referral source: Cristina Malone MD    : 1980  Medical/Treatment Diagnosis: Low back pain [M54.5]  Cervicalgia [M54.2]  Onset Date:2018  Prior Hospitalization: see medical history   Provider#: [id number]  Comorbidities: chronic arthritis, knee pain,   Prior Level of Function:patient was independent with ADLs and activities prior to start of chronic neck and back pain    Medications: Verified on Patient Summary List    Visits from Start of Care: 28    Missed Visits: 1  Reporting Period : 2018 to 2018    Long Term Goals: To be accomplished in 4 weeks:              Patient will increase B LE strength to 5/5 MMT throughout to aid in completion of ADLs.             EMGSBY at PN: 4/5 in hips              Current: 4 to 4+/5 in Left LE and 4+ to 5/5 in Right LE 18 Progressing                 Patient will report pain no greater than 3/10 throughout entire day to aid in completion of ADLs.             TNNNSF at PN:7/10              Current:6/10 at worst but 4/10 on average 18 Progressing                 Patent will be able to sit for 30mins to be able to drive to appointments and complete ADLs.             BFDMYP at PN: pain with sitting greater than several minutes              Current: Pt reports no increased pain for sitting less than 30 min 18 Progressing         Assessment/ Summary of Care: Pt able to perform HEP ex with no increased pain above initial levels. Pt has progressed towards goals well and would continue to benefit from strengthening ex. Pt reports at this time, pt feels confident in ability to continue with HEP and Y  assistance to perform strength training and ex post therapy.  Pt given updated HEP in order to achieve maximum benefit post therapy. Pt is D/C at this time due to meeting acceptable subjective state in which pt is able  to work towards goals on own.          RECOMMENDATIONS:  [x]Discontinue therapy: [x]Patient has reached or is progressing toward set goals      []Patient is non-compliant or has abdicated      []Due to lack of appreciable progress towards set goals    Jacqueline Mcmahon, PTA 1/4/2019 1:22 PM      I have reviewed and agree with the above assessment,  Vickie Gomez, PT, DPT

## 2019-07-08 ENCOUNTER — HOSPITAL ENCOUNTER (OUTPATIENT)
Dept: PHYSICAL THERAPY | Age: 39
Discharge: HOME | End: 2019-07-08
Payer: MEDICAID

## 2019-07-08 PROCEDURE — 97110 THERAPEUTIC EXERCISES: CPT

## 2019-07-08 PROCEDURE — 97161 PT EVAL LOW COMPLEX 20 MIN: CPT
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
## 2019-07-10 ENCOUNTER — HOSPITAL ENCOUNTER (OUTPATIENT)
Dept: PHYSICAL THERAPY | Age: 39
Discharge: HOME | End: 2019-07-10
Payer: MEDICAID

## 2019-07-10 PROCEDURE — 97110 THERAPEUTIC EXERCISES: CPT

## 2019-07-10 NOTE — PROGRESS NOTES
PT DAILY TREATMENT NOTE - [employer]     Patient Name: Niko Stuart  Date:7/10/2019  : 1980  [x]  Patient  Verified  Payor: BLUE CROSS MEDICAID / Plan: Graciela Griffiths / Product Type: Managed Care Medicaid /    In time:320  Out time:400  Total Treatment Time (min): 40  Total Timed Codes (min): 40   1:1 Treatment Time ([facility] only): 40   Visit #: 2 of 16    Treatment Area: Knee pain, right [M25.561]    SUBJECTIVE  Pain Level (0-10 scale): 0  Any medication changes, allergies to medications, adverse drug reactions, diagnosis change, or new procedure performed?: [x] No    [] Yes (see summary sheet for update)  Subjective functional status/changes:   [] No changes reported    Patient reports no new changes since initial evaluation. She reports compliance with initial HEP. OBJECTIVE    40 min Therapeutic Exercise:  [x] See flow sheet :   Rationale: increase ROM, increase strength and decrease pain to improve the patients ability to complete ADLs          With   [x] TE   [] TA   [] neuro   [] other: Patient Education: [x] Review HEP    [] Progressed/Changed HEP based on:   [] positioning   [] body mechanics   [] transfers   [] heat/ice application    [] other:      Other Objective/Functional Measures:      Pain Level (0-10 scale) post treatment: 2    ASSESSMENT/Changes in Function: Patient responds well to treatment session. Reviewed HEP to promote good carry over at home. Patient required cues to reduce compensatory strategies with hip strengthening activities.  No adverse effects were noted from today's treatment session    Patient will continue to benefit from skilled PT services to modify and progress therapeutic interventions, address functional mobility deficits, address ROM deficits, address strength deficits, analyze and address soft tissue restrictions, analyze and cue movement patterns, analyze and modify body mechanics/ergonomics, assess and modify postural abnormalities, instruct in home and community integration to attain remaining goals. []  See Plan of Care  []  See progress note/recertification  []  See Discharge Summary         Progress towards goals / Updated goals:   Short Term Goals: To be accomplished in 2 weeks:                 Patient will report compliance with HEP at least 1x/day to aid in rehabilitation program.                 Status at IE: Provided HEP handout                 Current: In-progress reviewed HEP to promote good carryover with exercise at home 07/10/2019     Long Term Goals: To be accomplished in 4 weeks:                 Patient will increase strength to 4+/5 throughout B LEs to aid in completion of ADLs. Status at IE: 4-/5                 Current:Same as IE                    Patient will report pain less than 1-2/10 average to aid in completion of ADLs. Status at IE: 2-10/10                 Current:Same as IE                    Patient will perform 5 pain free kettle bell squats with 10 lbs with good form/technique to aid in completion of ADLs. Status at IE:10 sit to stands without weight and 4/10 pain                 Current:Same as IE     Patient will improve FOTO to 57 points overall to demonstrate improvement in functional ability.                  Status at IE: 40                 Current: Same as IE    PLAN  []  Upgrade activities as tolerated     [x]  Continue plan of care  []  Update interventions per flow sheet       []  Discharge due to:_  []  Other:_      Joy Sanchez PT, DPT 7/10/2019  3:22 PM    Future Appointments   Date Time Provider Ivan Nails   7/10/2019  3:30 PM ARNOL Elias [facility]   7/23/2019  9:30 AM [facility] ARNOL REYNOSO [facility]   7/25/2019 11:00 AM [facility] ARNOL REYNOSO [facility]   7/30/2019 10:30 AM ARNOL Elias [facility]   8/1/2019  9:00 AM ARNOL Elias [facility]

## 2019-07-23 ENCOUNTER — HOSPITAL ENCOUNTER (OUTPATIENT)
Dept: PHYSICAL THERAPY | Age: 39
Discharge: HOME | End: 2019-07-23
Payer: MEDICAID

## 2019-07-23 PROCEDURE — 97110 THERAPEUTIC EXERCISES: CPT

## 2019-07-23 NOTE — PROGRESS NOTES
PT DAILY TREATMENT NOTE - [facility]     Patient Name: Saurav Lamas  Date:2019  : 1980  [x]  Patient  Verified  Payor: BLUE CROSS MEDICAID / Plan: Larissa Sidra / Product Type: Managed Care Medicaid /    In time:510  Out time:540  Total Treatment Time (min): 30  Total Timed Codes (min): 30   1:1 Treatment Time ([address] only): 30   Visit #: 3 of 16    Treatment Area: Knee pain, right [M25.561]    SUBJECTIVE  Pain Level (0-10 scale): 2  Any medication changes, allergies to medications, adverse drug reactions, diagnosis change, or new procedure performed?: [x] No    [] Yes (see summary sheet for update)  Subjective functional status/changes:   [] No changes reported  Patient reports that her knee is feeling better today and has less pain. OBJECTIVE    30 min Therapeutic Exercise:  [x] See flow sheet :   Rationale: increase ROM, increase strength and decrease pain to improve the patients ability to complete ADLs          With   [x] TE   [] TA   [] neuro   [] other: Patient Education: [x] Review HEP    [] Progressed/Changed HEP based on:   [] positioning   [] body mechanics   [] transfers   [] heat/ice application    [] other:      Other Objective/Functional Measures: NA     Pain Level (0-10 scale) post treatment: 1    ASSESSMENT/Changes in Function: Patient responds well to treatment session. Patient continues to require cues to reduce hip compensatory strategies. Progressed step ups as patient demonstrated increased activity tolerance.  No adverse effects were noted from today's treatment session      Patient will continue to benefit from skilled PT services to modify and progress therapeutic interventions, address functional mobility deficits, address ROM deficits, address strength deficits, analyze and address soft tissue restrictions, analyze and cue movement patterns, analyze and modify body mechanics/ergonomics, assess and modify postural abnormalities, instruct in home and community integration to attain remaining goals. []  See Plan of Care  []  See progress note/recertification  []  See Discharge Summary         Progress towards goals / Updated goals:  Short Term Goals: To be accomplished in 2 weeks:                 HIOTUDR will report compliance with HEP at least 1x/day to aid in rehabilitation program.                 Status at IE: Provided HEP handout                 CBRRVXX: In-progress reviewed HEP to promote good carryover with exercise at home 07/10/2019     Long Term Goals: To be accomplished in 4 weeks:                 Patient will increase strength to 4+/5 throughout B LEs to aid in completion of ADLs.                Status at IE: 4-/5                 Current:Same as IE                    Patient will report pain less than 1-2/10 average to aid in completion of ADLs.                Status at IE: 2-10/10                 Current:Same as IE                    Patient will perform 5 pain free kettle bell squats with 10 lbs with good form/technique to aid in completion of ADLs.                Status at IE:10 sit to stands without weight and 4/10 pain                 Current: In-progress patient performed 10 sit to stands with 1/10 pain 07/23/2019     Patient will improve FOTO to 57 points overall to demonstrate improvement in functional ability.                  Status at IE: 40                 Current: Same as IE    PLAN  []  Upgrade activities as tolerated     [x]  Continue plan of care  []  Update interventions per flow sheet       []  Discharge due to:_  []  Other:_      Gigi Joseph, PT, DPT 7/23/2019  5:51 PM    Future Appointments   Date Time Provider Ivan Nails   7/25/2019 11:00 AM Diana [facility]   7/30/2019 10:30 AM ARNOL Roman [facility]   8/1/2019  9:00 AM ARNOL Roman [facility]

## 2019-07-25 ENCOUNTER — HOSPITAL ENCOUNTER (OUTPATIENT)
Dept: PHYSICAL THERAPY | Age: 39
Discharge: HOME | End: 2019-07-25
Payer: MEDICAID

## 2019-07-25 PROCEDURE — 97110 THERAPEUTIC EXERCISES: CPT

## 2019-07-25 NOTE — PROGRESS NOTES
PHYSICAL THERAPY - DAILY TREATMENT NOTE    Patient Name: Kadie Francisco        Date: 2019  : 1980   yes Patient  Verified  Visit #:   4   of   16  Insurance: Payor: BLUE CROSS MEDICAID / Plan: Antionette Luciano / Product Type: Managed Care Medicaid /      In time: 11:00 Out time: 11:36   Total Treatment Time: 36     Medicare/BCBS Time Tracking (below)   Total Timed Codes (min):  36 1:1 Treatment Time:  36     TREATMENT AREA =  Knee pain, right [M25.561]    SUBJECTIVE  Pain Level (on 0 to 10 scale):  0  / 10   Medication Changes/New allergies or changes in medical history, any new surgeries or procedures?    no  If yes, update Summary List   Subjective Functional Status/Changes:  []  No changes reported     Still gets some pinching/knee cap pain with twisting or stepping off stairs          OBJECTIVE    36 min Therapeutic Exercise:  [x]  See flow sheet   Rationale:      increase ROM and increase strength to improve the patients ability to amb/stand prolonged     Billed With/As:   [x] TE   [] TA   [] Neuro   [] Self Care Patient Education: [x] Review HEP    [] Progressed/Changed HEP based on:   [] positioning   [] body mechanics   [] transfers   [] heat/ice application    [] other:      Other Objective/Functional Measures:    No c/o pain or difficulty with exs  Good form for all     Post Treatment Pain Level (on 0 to 10) scale:   0  / 10     ASSESSMENT  Assessment/Changes in Function:     Some continued reports of knee pain with twisting or stairs; good daniel to treatment     []  See Progress Note/Recertification   Patient will continue to benefit from skilled PT services to modify and progress therapeutic interventions, address functional mobility deficits, address ROM deficits, address strength deficits, analyze and address soft tissue restrictions, analyze and cue movement patterns, analyze and modify body mechanics/ergonomics, assess and modify postural abnormalities, instruct in home and community integration to attain remaining goals. Progress toward goals / Updated goals:    Progress towards goals / Updated goals:  Short Term Goals: To be accomplished in 2 weeks:                 QFMQTEZ will report compliance with HEP at least 1x/day to aid in rehabilitation program.                 Status at IE: Provided HEP handout                 Current: In-progress reviewed HEP to promote good carryover with exercise at home 07/10/2019     Long Term Goals: To be accomplished in 4 weeks:                 Patient will increase strength to 4+/5 throughout B LEs to aid in completion of ADLs.                Status at IE: 4-/5                 Current:Same as IE                    Patient will report pain less than 1-2/10 average to aid in completion of ADLs.                Status at IE: 2-10/10                 Current:Same as IE                    Patient will perform 5 pain free kettle bell squats with 10 lbs with good form/technique to aid in completion of ADLs.                Status at IE:10 sit to stands without weight and 4/10 pain                 Current: In-progress patient performed 10 sit to stands with 1/10 pain 07/23/2019     Patient will improve FOTO to 57 points overall to demonstrate improvement in functional ability.                  Status at IE: 40                 Current: Same as IE     PLAN  [x]  Upgrade activities as tolerated yes Continue plan of care   []  Discharge due to :    []  Other:      Therapist: Terrence Fleming PT, DPT, OCS, CSCS    Date: 7/25/2019 Time: 11:02 AM     Future Appointments   Date Time Provider Ivan Nails   7/30/2019 10:30 AM ARNOL Canchola [facility]   8/1/2019  9:00 AM ARNOL Canchola [facility]

## 2019-07-30 ENCOUNTER — HOSPITAL ENCOUNTER (OUTPATIENT)
Dept: PHYSICAL THERAPY | Age: 39
Discharge: HOME | End: 2019-07-30
Payer: MEDICAID

## 2019-07-30 PROCEDURE — 97110 THERAPEUTIC EXERCISES: CPT

## 2019-07-30 NOTE — PROGRESS NOTES
PT DAILY TREATMENT NOTE - [facility]     Patient Name: Shasta Dasilva  Date:2019  : 1980  [x]  Patient  Verified  Payor: BLUE CROSS MEDICAID / Plan: Juleejabier Mcleanzan / Product Type: Managed Care Medicaid /    In time:1035  Out time:1100  Total Treatment Time (min): 25  Total Timed Codes (min): 25   1:1 Treatment Time ([facility] only): 25   Visit #: 5 of 16    Treatment Area: Knee pain, right [M25.561]    SUBJECTIVE  Pain Level (0-10 scale): 0  Any medication changes, allergies to medications, adverse drug reactions, diagnosis change, or new procedure performed?: [x] No    [] Yes (see summary sheet for update)  Subjective functional status/changes:   [] No changes reported  Patient reports that she is tired today. She states that she is     OBJECTIVE     25 min Therapeutic Exercise:  [x] See flow sheet :   Rationale: increase ROM, increase strength and decrease pain to improve the patients ability to complete ADLs          With   [x] TE   [] TA   [] neuro   [] other: Patient Education: [x] Review HEP    [] Progressed/Changed HEP based on:   [] positioning   [] body mechanics   [] transfers   [] heat/ice application    [] other:      Other Objective/Functional Measures: FOTO 63     Pain Level (0-10 scale) post treatment: 0    ASSESSMENT/Changes in Function: Patient responds well to treatment session. Reviewed importance of exercise compliance to foster progress toward patient goals. Patient agreed to provide more effort with HEP participation. She required cues to reduced compensatory strategies secondary to hip weakness. No adverse effects were noted from today's treatment session.      Patient will continue to benefit from skilled PT services to modify and progress therapeutic interventions, address functional mobility deficits, address ROM deficits, address strength deficits, analyze and address soft tissue restrictions, analyze and cue movement patterns, analyze and modify body mechanics/ergonomics, assess and modify postural abnormalities, instruct in home and community integration to attain remaining goals. []  See Plan of Care  []  See progress note/recertification  []  See Discharge Summary         Progress towards goals / Updated goals:  Short Term Goals: To be accomplished in 2 weeks:                 KARSTEN will report compliance with HEP at least 1x/day to aid in rehabilitation program.                 Status at IE: Provided HEP handout                 Current: In-progress reports occasional compliance with HEP 07/30/3019     Long Term Goals: To be accomplished in 4 weeks:                 Patient will increase strength to 4+/5 throughout B LEs to aid in completion of ADLs.                Status at IE: 4-/5                 Current:Same as IE                    Patient will report pain less than 1-2/10 average to aid in completion of ADLs.                Status at IE: 2-10/10                 Current:Same as IE                    Patient will perform 5 pain free kettle bell squats with 10 lbs with good form/technique to aid in completion of ADLs.                Status at IE:10 sit to stands without weight and 4/10 pain                 Current: In-progress patient performed 10 sit to stands with 1/10 pain 07/23/2019     Patient will improve FOTO to 57 points overall to demonstrate improvement in functional ability.                Status at IE: 40                 Current:   In-progress 63 07/30/2019     PLAN  []  Upgrade activities as tolerated     [x]  Continue plan of care  []  Update interventions per flow sheet       []  Discharge due to:_  []  Other:_      Yoko Ortiz PT, DPT 7/30/2019  10:43 AM    Future Appointments   Date Time Provider Ivan Nails   8/1/2019  9:00 AM Orvan Habermann, PT [facility]   8/12/2019  2:30 PM Orvan Habermann, PT [facility]   8/14/2019 11:00 AM Orvan Habermann, PT [facility]   8/19/2019  2:30 PM Orvan Habermann, PT [facility] 8/21/2019  1:30 PM Karen Gaspar PT [facility]

## 2019-08-01 ENCOUNTER — HOSPITAL ENCOUNTER (OUTPATIENT)
Dept: PHYSICAL THERAPY | Age: 39
Discharge: HOME | End: 2019-08-01
Payer: MEDICAID

## 2019-08-01 PROCEDURE — 97110 THERAPEUTIC EXERCISES: CPT

## 2019-08-01 NOTE — PROGRESS NOTES
PT DAILY TREATMENT NOTE - [facility]     Patient Name: Navi Ontiveros  Date:2019  : 1980  [x]  Patient  Verified  Payor: BLUE CROSS MEDICAID / Plan: [employer] / Product Type: Managed Care Medicaid /    In [place]  Out [facility]:[id number]  Total Treatment Time (min): 20  Total Timed Codes (min): 20   1:1 Treatment Time ( only): 15   Visit #: 6 of 16    Treatment Area: Knee pain, right [M25.561]    SUBJECTIVE  Pain Level (0-10 scale): 0  Any medication changes, allergies to medications, adverse drug reactions, diagnosis change, or new procedure performed?: [x] No    [] Yes (see summary sheet for update)  Subjective functional status/changes:   [] No changes reported  Patient reports that she returned to the gym and did fairly well but was limited by knee soreness. She states that she is sorry for being late but has difficult time coming to early appointments. OBJECTIVE    15 min Therapeutic Exercise:  [x] See flow sheet :   Rationale: increase ROM, increase strength and decrease pain to improve the patients ability to complete ADLs        With   [x] TE   [] TA   [] neuro   [] other: Patient Education: [x] Review HEP    [] Progressed/Changed HEP based on:   [] positioning   [] body mechanics   [] transfers   [] heat/ice application    [] other:      Other Objective/Functional Measures:  NA     Pain Level (0-10 scale) post treatment: 0    ASSESSMENT/Changes in Function: Patient responds well to treatment session. Patient demonstrated improved activity will progress exercise as toelrated.  No adverse effects were noted from today's treatment session      Patient will continue to benefit from skilled PT services to modify and progress therapeutic interventions, address functional mobility deficits, address ROM deficits, address strength deficits, analyze and address soft tissue restrictions, analyze and cue movement patterns, analyze and modify body mechanics/ergonomics, assess and modify postural abnormalities, instruct in home and community integration to attain remaining goals. []  See Plan of Care  []  See progress note/recertification  []  See Discharge Summary         Progress towards goals / Updated goals:  Short Term Goals: To be accomplished in 2 weeks:                 OUQGWDX will report compliance with HEP at least 1x/day to aid in rehabilitation program.                 Status at IE: Provided HEP handout                 Current: In-progress reports occasional compliance with HEP 07/30/3019     Long Term Goals: To be accomplished in 4 weeks:                 Patient will increase strength to 4+/5 throughout B LEs to aid in completion of ADLs.                Status at IE: 4-/5                 Current:Same as IE                    Patient will report pain less than 1-2/10 average to aid in completion of ADLs.                Status at IE: 2-10/10                 Current:Same as IE                    Patient will perform 5 pain free kettle bell squats with 10 lbs with good form/technique to aid in completion of ADLs.                Status at IE:10 sit to stands without weight and 4/10 pain                 Current: In-progress patient performed 10 sit to stands with 1/10 pain 07/23/2019     Patient will improve FOTO to 57 points overall to demonstrate improvement in functional ability.                Status at IE: 40                 Current:   In-progress 63 07/30/2019     PLAN  []  Upgrade activities as tolerated     [x]  Continue plan of care  []  Update interventions per flow sheet       []  Discharge due to:_  []  Other:_      Joy Sanchez, PT, DPT 8/1/2019  9:16 AM    Future Appointments   Date Time Provider Ivan Nails   8/12/2019  2:30 PM ARNOL Elias [facility]   8/14/2019 11:00 AM ARNOL Elias [facility]   8/19/2019  2:30 PM ARNOL Elias [facility]   8/21/2019  1:30 PM ARNOL Elias [facility]

## 2019-08-12 ENCOUNTER — HOSPITAL ENCOUNTER (OUTPATIENT)
Dept: PHYSICAL THERAPY | Age: 39
Discharge: HOME | End: 2019-08-12
Payer: MEDICAID

## 2019-08-12 PROCEDURE — 97110 THERAPEUTIC EXERCISES: CPT

## 2019-08-12 NOTE — PROGRESS NOTES
PT DAILY TREATMENT NOTE - [facility]     Patient Name: Mark Cameron  Date:2019  : 1980  [x]  Patient  Verified  Payor: BLUE CROSS MEDICAID / Plan: Cristi Arredondopriscilla / Product Type: Managed Care Medicaid /    In time: 230  Out time:330  Total Treatment Time (min): 30  Total Timed Codes (min): 30   1:1 Treatment Time ([facility] only): 30   Visit #: 7 of 16    Treatment Area: Knee pain, right [M25.561]    SUBJECTIVE  Pain Level (0-10 scale): 0  Any medication changes, allergies to medications, adverse drug reactions, diagnosis change, or new procedure performed?: [x] No    [] Yes (see summary sheet for update)  Subjective functional status/changes:   [] No changes reported  Patient reports that she has neck soreness. OBJECTIVE    30 min Therapeutic Exercise:  [x] See flow sheet :   Rationale: increase ROM, increase strength and decrease pain to improve the patients ability to complete ADLs          With   [x] TE   [] TA   [] neuro   [] other: Patient Education: [x] Review HEP    [] Progressed/Changed HEP based on:   [] positioning   [] body mechanics   [] transfers   [] heat/ice application    [] other:      Other Objective/Functional Measures: NA     Pain Level (0-10 scale) post treatment: 0    ASSESSMENT/Changes in Function:     Patient responds well to treatment session. Patient responded well to cervical retraction as she had a reduction in symptoms. She is progressing well as she had no reports of knee pain with squats. Provided cues to improve squat mechanics promoting target muscle recruitment.  No adverse effects were noted from today's treatment session    Patient will continue to benefit from skilled PT services to modify and progress therapeutic interventions, address functional mobility deficits, address ROM deficits, address strength deficits, analyze and address soft tissue restrictions, analyze and cue movement patterns, analyze and modify body mechanics/ergonomics, assess and modify postural abnormalities, instruct in home and community integration to attain remaining goals. []  See Plan of Care  []  See progress note/recertification  []  See Discharge Summary         Progress towards goals / Updated goals:  Short Term Goals: To be accomplished in 2 weeks:                 PGIQIJF will report compliance with HEP at least 1x/day to aid in rehabilitation program.                 Status at IE: Provided HEP handout                 Current: Met 08/12/2019     Long Term Goals: To be accomplished in 4 weeks:                 TERSTTQ will increase strength to 4+/5 throughout B LEs to aid in completion of ADLs.                Status at IE: 4-/5                 Current:Same as IE                     Patient will report pain less than 1-2/10 average to aid in completion of ADLs.                Status at IE: 2-10/10                 Current:Same as IE                    Patient will perform 5 pain free kettle bell squats with 10 lbs with good form/technique to aid in completion of ADLs.                Status at IE:10 sit to stands without weight and 4/10 pain                 Current: In-progress patient performed 10 sit to stands with 1/10 pain 07/23/2019     Patient will improve FOTO to 57 points overall to demonstrate improvement in functional ability.                  Status at IE: 40                 Current:  In-progress 63 07/30/2019     PLAN  []  Upgrade activities as tolerated     [x]  Continue plan of care  []  Update interventions per flow sheet       []  Discharge due to:_  []  Other:_      Braulio Zaman PT, DPT 8/12/2019  2:26 PM    Future Appointments   Date Time Provider Ivan Nails   8/12/2019  2:30 PM ARNOL Mix [facility]   8/14/2019 11:00 AM ARNOL Mix [facility]   8/19/2019  2:30 PM ARNOL Mxi [facility]   8/21/2019  1:30 PM ARNOL Mix [facility]

## 2019-08-14 ENCOUNTER — HOSPITAL ENCOUNTER (OUTPATIENT)
Dept: PHYSICAL THERAPY | Age: 39
Discharge: HOME | End: 2019-08-14
Payer: MEDICAID

## 2019-08-14 PROCEDURE — 97110 THERAPEUTIC EXERCISES: CPT

## 2019-08-14 NOTE — PROGRESS NOTES
PT DAILY TREATMENT NOTE - [facility]     Patient Name: Oleg Bailey  Date:2019  : 1980  [x]  Patient  Verified  Payor: BLUE CROSS MEDICAID / Plan: Camelia Begin / Product Type: Managed Care Medicaid /    In time:1110  Out time:1148  Total Treatment Time (min): 38  Total Timed Codes (min): 38   1:1 Treatment Time ( [address] only): 38   Visit #: 8 of 16    Treatment Area: Knee pain, right [M25.561]    SUBJECTIVE  Pain Level (0-10 scale): 1  Any medication changes, allergies to medications, adverse drug reactions, diagnosis change, or new procedure performed?: [x] No    [] Yes (see summary sheet for update)  Subjective functional status/changes:   [] No changes reported  Patient reports that she has soreness when negotiating stairs. OBJECTIVE    38 min Therapeutic Exercise:  [x] See flow sheet :   Rationale: increase ROM, increase strength and decrease pain to improve the patients ability to complete ADLs          With   [x] TE   [] TA   [] neuro   [] other: Patient Education: [x] Review HEP    [] Progressed/Changed HEP based on:   [] positioning   [] body mechanics   [] transfers   [] heat/ice application    [] other:      Other Objective/Functional Measures: NA     Pain Level (0-10 scale) post treatment: 0    ASSESSMENT/Changes in Function: Patient responds well to treatment session. Progressed patient by increasing resistance with exericse. Patient continues to be challenged with hip strengthening activities. Will progress exercise next visit.  No adverse effects were noted from today's treatment session    Patient will continue to benefit from skilled PT services to modify and progress therapeutic interventions, address functional mobility deficits, address ROM deficits, address strength deficits, analyze and address soft tissue restrictions, analyze and cue movement patterns, analyze and modify body mechanics/ergonomics, assess and modify postural abnormalities, instruct in home and community integration to attain remaining goals. []  See Plan of Care  []  See progress note/recertification  []  See Discharge Summary         Progress towards goals / Updated goals:  Short Term Goals: To be accomplished in 2 weeks:                 UPWGXNN will report compliance with HEP at least 1x/day to aid in rehabilitation program.                 Status at IE: Provided HEP handout                 Current: Met 08/12/2019     Long Term Goals: To be accomplished in 4 weeks:                 TRJXKRW will increase strength to 4+/5 throughout B LEs to aid in completion of ADLs.                Status at IE: 4-/5                 Current: In-progress 4/5 08/14/2019                     Patient will report pain less than 1-2/10 average to aid in completion of ADLs.                Status at IE: 2-10/10                 Current:Same as IE                    Patient will perform 5 pain free kettle bell squats with 10 lbs with good form/technique to aid in completion of ADLs.                Status at IE:10 sit to stands without weight and 4/10 pain                 Current: In-progress patient performed 10 sit to stands with 1/10 pain 07/23/2019     Patient will improve FOTO to 57 points overall to demonstrate improvement in functional ability.                  Status at IE: 40                 Current:  In-progress 63 07/30/2019     PLAN  []  Upgrade activities as tolerated     [x]  Continue plan of care  []  Update interventions per flow sheet       []  Discharge due to:_  []  Other:_      Taty Amado, PT, DPT 8/14/2019  11:19 AM    Future Appointments   Date Time Provider Ivan Nails   8/19/2019  2:30 PM ARNOL Roblero [facility]   8/21/2019  5:00 PM ARNOL Roblero [facility]
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
## 2019-08-19 ENCOUNTER — APPOINTMENT (OUTPATIENT)
Dept: PHYSICAL THERAPY | Age: 39
End: 2019-08-19
Payer: MEDICAID

## 2019-08-26 ENCOUNTER — APPOINTMENT (OUTPATIENT)
Dept: PHYSICAL THERAPY | Age: 39
End: 2019-08-26
Payer: MEDICAID

## 2019-08-29 ENCOUNTER — APPOINTMENT (OUTPATIENT)
Dept: PHYSICAL THERAPY | Age: 39
End: 2019-08-29
Payer: MEDICAID

## 2019-09-03 ENCOUNTER — APPOINTMENT (OUTPATIENT)
Dept: PHYSICAL THERAPY | Age: 39
End: 2019-09-03

## 2019-09-05 ENCOUNTER — APPOINTMENT (OUTPATIENT)
Dept: PHYSICAL THERAPY | Age: 39
End: 2019-09-05

## 2019-09-09 ENCOUNTER — APPOINTMENT (OUTPATIENT)
Dept: PHYSICAL THERAPY | Age: 39
End: 2019-09-09

## 2019-09-11 ENCOUNTER — APPOINTMENT (OUTPATIENT)
Dept: PHYSICAL THERAPY | Age: 39
End: 2019-09-11

## 2019-09-25 NOTE — PROGRESS NOTES
[facility] at [address]: [phone]   Fax: [phone]  Discharge Summary  Patient Name: Ashlyn Delgado : 1980   Medical   Diagnosis: Knee pain, right [M25.561] Treatment Diagnosis: Right Knee Pain   Onset Date: 1 year ago     Referral Source: Reid Lal [facility]): 2019   Prior Hospitalization: See medical history Provider #: [id number]   Prior Level of Function: Active, Exercise, Painting, Cycling   Comorbidities: OA, Thyroid Disorder, Scoliosis   Medications: Verified on Patient Summary List      ===========================================================================================  Assessment / Summary of Care:  Ashlyn Delgado is a 44 y.o. female with right knee pain. Patient has not returned to clinic in 30 days. Unable to perform formal assessment on patient as a result.     ===========================================================================================    Plan: Discharge to [facility].     Goals: Unable to reassess.    ===========================================================================================  Subjective: NA      Objective: NA    Therapist Signature: Germania Mart PT, DPT Date: 2019     Time: 8:23 AM

## 2020-09-09 ENCOUNTER — HOSPITAL ENCOUNTER (OUTPATIENT)
Dept: PHYSICAL THERAPY | Age: 40
Discharge: HOME | End: 2020-09-09
Payer: MEDICAID

## 2020-09-09 PROCEDURE — 97161 PT EVAL LOW COMPLEX 20 MIN: CPT
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
## 2020-09-22 ENCOUNTER — HOSPITAL ENCOUNTER (OUTPATIENT)
Dept: PHYSICAL THERAPY | Age: 40
Discharge: HOME | End: 2020-09-22
Payer: MEDICAID

## 2020-09-22 PROCEDURE — 97110 THERAPEUTIC EXERCISES: CPT

## 2020-09-22 PROCEDURE — 97530 THERAPEUTIC ACTIVITIES: CPT

## 2020-09-22 PROCEDURE — 97012 MECHANICAL TRACTION THERAPY: CPT

## 2020-09-22 PROCEDURE — 97112 NEUROMUSCULAR REEDUCATION: CPT

## 2020-09-22 NOTE — PROGRESS NOTES
PT DAILY TREATMENT NOTE    Patient Name: Lv Alvarez  Date:2020  : 1980  [x]  Patient  Verified  Payor: BLUE CROSS MEDICAID / Plan: Reina Deacon / Product Type: Managed Care Medicaid /    In time: 325  Out time: 346  Total Treatment Time (min): 64  Total Timed Codes (min): 49  1:1 Treatment Time ([facility] only): 40   Visit #: 2 of 16    Treatment Area: Neck pain [M54.2]    SUBJECTIVE  Pain Level (0-10 scale): 7-8  Any medication changes, allergies to medications, adverse drug reactions, diagnosis change, or new procedure performed?: [x] No    [] Yes (see summary sheet for update)  Subjective functional status/changes:   [] No changes reported  \"The pain has been much worse the past two to three days. The right side of the neck really hurts and I have a lot of pain down right arm from elbow to the hand. \"    OBJECTIVE    Modalities Rationale:     decrease edema and decrease pain to improve patient's ability to Complete ADLS   min []  Vasopneumatic Device, press/temp:    15 min [x]  Other: Mechanical cervical traction, 30 lbs intermittent   [] Skin assessment post-treatment (if applicable):    []  intact    []  redness- no adverse reaction     []redness - adverse reaction:        15 min Therapeutic Exercise:  [x] See flow sheet :   Rationale: increase ROM, increase strength and decrease pain to improve the patients ability to complete ADLs    14 min Therapeutic Activity:  [x]  See flow sheet :   Rationale: increase ROM, increase strength and improve coordination  to improve the patients ability to Complete ADLS     15 min Neuromuscular Re-education:  [x]  See flow sheet :   Rationale: improve coordination, improve balance and increase proprioception  to improve the patients ability to complete ADLS, and decrease falls risk       With   [] TE   [] TA   [] neuro   [] other: Patient Education: [x] Review HEP    [] Progressed/Changed HEP based on:   [] positioning   [] body mechanics [] transfers   [] heat/ice application    [] other:      Other Objective/Functional Measures: NA     Pain Level (0-10 scale) post treatment: 5    ASSESSMENT/Changes in Function: Patient instructed in and provided written copy of initial HEP. Patient requires intermittent verbal and visual cues for correct performance of exercises. Notes marked decrease in radicular symptoms with mechanical traction. Patient responds well to treatment session. No adverse effects were noted from today's treatment session. Patient will continue to benefit from skilled PT services to modify and progress therapeutic interventions, address functional mobility deficits, address ROM deficits, address strength deficits, analyze and address soft tissue restrictions, analyze and cue movement patterns, analyze and modify body mechanics/ergonomics, assess and modify postural abnormalities,  and instruct in home and community integration to attain remaining goals. []  See Plan of Care  []  See progress note/recertification  []  See Discharge Summary         Progress towards goals / Updated goals:  Short Term Goals: To be accomplished in 4 weeks:                   Patient will report compliance with HEP 1x/day to aid in rehabilitation program.                   Status at Ie: Patient to be instructed in and provided written copy of initial Home Exercise Program at first treatment session. Current: Patient instructed in and provided written copy of initial HEP.     9/22/2020                      Patient will increase cervical ROM to [place] all planes to aid in completion of ADLs.                    Status at IE:  Cervical AROM Comments:pain, area   Forward flexion 42     Extension 51     SB right 25  Increased right cervical pain   SB left  43     Rotation right 44 Increased right cervical pain   Rotation left 23                      Current: Same as IE     Long Term Goals: To be accomplished in 8 weeks: Patient will increase cervical strength to 5/5 MMT throughout to aid in completion of ADLs. Status at IE:  Cervical Strength Comments:pain, area   Forward flexion 4+     Extension 4     SB right 3+  Increased right cervical pain   SB left  4     Rotation right 3+ Increased right cervical pain   Rotation left 4                      Current: Same as IE                      Patient will report pain no greater than 1-2/10 throughout entire day to aid in completion of ADLs. Status at [address] [place] [address] 6-10/10                   Current: Same as IE                      Patent will be able to lift  10 lbs overhead without pain to be able to return to full work duties. Status at IE: 0 lbs                   Current: Same as IE                      Patient will improve FOTO (an established functional score where 100 = no disability) score to 60 points to demonstrate improvement in functional status. Status at IE: 48                   Current: Same as IE         PLAN  []  Upgrade activities as tolerated     [x]  Continue plan of care  []  Update interventions per flow sheet       []  Discharge due to:_  []  Other:_      Helen Casey PT, DPT 9/22/2020  3:32 PM    No future appointments.

## 2020-09-24 ENCOUNTER — APPOINTMENT (OUTPATIENT)
Dept: PHYSICAL THERAPY | Age: 40
End: 2020-09-24
Payer: MEDICAID

## 2020-09-29 ENCOUNTER — HOSPITAL ENCOUNTER (OUTPATIENT)
Dept: PHYSICAL THERAPY | Age: 40
Discharge: HOME | End: 2020-09-29
Payer: MEDICAID

## 2020-09-29 PROCEDURE — 97110 THERAPEUTIC EXERCISES: CPT

## 2020-09-29 PROCEDURE — 97012 MECHANICAL TRACTION THERAPY: CPT

## 2020-09-29 PROCEDURE — 97530 THERAPEUTIC ACTIVITIES: CPT

## 2020-09-29 PROCEDURE — 97112 NEUROMUSCULAR REEDUCATION: CPT

## 2020-09-29 NOTE — PROGRESS NOTES
PT DAILY TREATMENT NOTE    Patient Name: Roxane Aguirre  Date:2020  : 1980  [x]  Patient  Verified  Payor: BLUE CROSS MEDICAID / Plan: Elizabet Svetlana / Product Type: Managed Care Medicaid /    In time: 799  Out time: 156  Total Treatment Time (min): 54  Total Timed Codes (min): 39  1:1 Treatment Time ( [address] only): 44   Visit #: 3 of 16    Treatment Area: Neck pain [M54.2]    SUBJECTIVE  Pain Level (0-10 scale): 4.5  Any medication changes, allergies to medications, adverse drug reactions, diagnosis change, or new procedure performed?: [x] No    [] Yes (see summary sheet for update)  Subjective functional status/changes:   [] No changes reported  \"I have been doing the exercises and am being more careful and I am feeling much better today. Not much pain down the arm, just in the neck and shoulder. \"    OBJECTIVE  Modalities Rationale:     decrease edema and decrease pain to improve patient's ability to Complete ADLS           min []? Vasopneumatic Device, press/temp:     15 min [x]? Other: Mechanical cervical traction, 30 lbs intermittent   []? Skin assessment post-treatment (if applicable):    []?  intact    []? redness- no adverse reaction     []? redness - adverse reaction:         13 min Therapeutic Exercise:  [x]? See flow sheet :   Rationale: increase ROM, increase strength and decrease pain to improve the patients ability to complete ADLs     13 min Therapeutic Activity:  [x]? See flow sheet :   Rationale: increase ROM, increase strength and improve coordination  to improve the patients ability to Complete ADLS     13 min Neuromuscular Re-education:  [x]?   See flow sheet :   Rationale: improve coordination, improve balance and increase proprioception  to improve the patients ability to complete ADLS, and decrease falls risk         With   [] TE   [] TA   [] neuro   [] other: Patient Education: [x] Review HEP    [] Progressed/Changed HEP based on:   [] positioning   [] body mechanics   [] transfers   [] heat/ice application    [] other:      Other Objective/Functional Measures: NA     Pain Level (0-10 scale) post treatment: 2    ASSESSMENT/Changes in Function: Patient with marked decrease in radicular symptoms today, so able to advance from basic exercises to more functional stabilization exercises with fair tolerance. Patient responds well to treatment session. No adverse effects were noted from today's treatment session. Patient will continue to benefit from skilled PT services to modify and progress therapeutic interventions, address functional mobility deficits, address ROM deficits, address strength deficits, analyze and address soft tissue restrictions, analyze and cue movement patterns, analyze and modify body mechanics/ergonomics, assess and modify postural abnormalities,  and instruct in home and community integration to attain remaining goals. []  See Plan of Care  []  See progress note/recertification  []  See Discharge Summary         Progress towards goals / Updated goals:  Short Term Goals: To be accomplished in 4 weeks:                   ZFOOPLS will report compliance with HEP 1x/day to aid in rehabilitation program.                   Status at Ie: Patient to be instructed in and provided written copy of initial Home Exercise Program at first treatment session.                   Current: Patient instructed in and provided written copy of initial HEP.     9/22/2020                      Patient will increase cervical ROM to WNL in all planes to aid in completion of ADLs.                  Status at IE:  Cervical AROM Comments:pain, area   Forward flexion 42     Extension 51     SB right 25  Increased right cervical pain   SB left  43     Rotation right 44 Increased right cervical pain   Rotation left 23                      Current: Right cervical sidebend improved to 34 degrees with no increase in pain.      9/29/2020     Long Term Goals: To be accomplished in 8 weeks:                   ZLUQCQV will increase cervical strength to 5/5 MMT throughout to aid in completion of ADLs.                  Status at IE:  Cervical Strength Comments:pain, area   Forward flexion 4+     Extension 4     SB right 3+  Increased right cervical pain   SB left  4     Rotation right 3+ Increased right cervical pain   Rotation left 4                      Current: Same as IE                      EJEJRPR will report pain no greater than 1-2/10 throughout entire day to aid in completion of ADLs.                  Status at [address] [place] [address] 6-10/10                   Current: Same as IE                      Patent will be able to lift  10 lbs overhead without pain to be able to return to full work duties.                  Status at IE: 0 lbs                   Current: Same as IE                      Patient will improve FOTO (an established functional score where 100 = no disability) score to 60 points to demonstrate improvement in functional status.                    Status at IE: 48                   Current: Same as IE         PLAN  []  Upgrade activities as tolerated     [x]  Continue plan of care  []  Update interventions per flow sheet       []  Discharge due to:_  []  Other:_      Frances Lu PT, DPT 9/29/2020  8:47 AM    Future Appointments   Date Time Provider Ivan Nails   10/1/2020 11:00 AM Mali Nuneset, PT [facility]   10/5/2020 11:00 AM Hi Navarrete, PT [facility]   10/8/2020  8:00 AM Donegal Locket, PT [facility]   10/12/2020 11:00 AM Donegal Locket, PT [facility]   10/15/2020  8:00 AM Donegal Locket, PT [facility]   10/19/2020 11:00 AM Donegal Locket, PT [facility]   10/22/2020  8:00 AM Donegal Locket, PT [facility]   10/26/2020 11:00 AM Donegal Locket, PT [facility]   10/29/2020  8:00 AM Donegal Locket, PT [facility]

## 2020-10-02 ENCOUNTER — HOSPITAL ENCOUNTER (OUTPATIENT)
Dept: PHYSICAL THERAPY | Age: 40
Discharge: HOME | End: 2020-10-02
Payer: MEDICAID

## 2020-10-02 PROCEDURE — 97530 THERAPEUTIC ACTIVITIES: CPT | Performed by: PHYSICAL THERAPIST

## 2020-10-02 PROCEDURE — 97012 MECHANICAL TRACTION THERAPY: CPT | Performed by: PHYSICAL THERAPIST

## 2020-10-02 PROCEDURE — 97112 NEUROMUSCULAR REEDUCATION: CPT | Performed by: PHYSICAL THERAPIST

## 2020-10-02 PROCEDURE — 97110 THERAPEUTIC EXERCISES: CPT | Performed by: PHYSICAL THERAPIST

## 2020-10-02 NOTE — PROGRESS NOTES
PT DAILY TREATMENT NOTE    Patient Name: Raman Alas  Date:10/2/2020  : 1980  [x]  Patient  Verified  Payor: BLUE CROSS MEDICAID / Plan: [employer] / Product Type: Managed Care Medicaid /    In time:10:55  Out time:12:03  Total Treatment Time (min): 68  Total Timed Codes (min): 55   Visit #: 4 of 16    Treatment Area: Neck pain [M54.2]    SUBJECTIVE  Pain Level (0-10 scale): 4  Any medication changes, allergies to medications, adverse drug reactions, diagnosis change, or new procedure performed?: [x] No    [] Yes (see summary sheet for update)  Subjective functional status/changes:   [] No changes reported  Feeling a bit of pain on the right side of my neck. Not sure. OBJECTIVE  Modalities Rationale:     decrease edema and decrease pain to improve patient's ability to Complete ADLS                min []? ?  Vasopneumatic Device, press/temp:     15 min [x]? ?  Other: Mechanical cervical traction, 26 lbs intermittent   []? ? Skin assessment post-treatment (if applicable):    []? ?  intact    []? ?  redness- no adverse reaction     []? ?redness - adverse reaction:        10 min Therapeutic Exercise:  [x]? ? See flow sheet :   Rationale: increase ROM, increase strength and decrease pain to improve the patients ability to complete ADLs     15 min Therapeutic Activity:  [x]? ?  See flow sheet :   Rationale: increase ROM, increase strength and improve coordination  to improve the patients ability to Complete ADLS     15 min Neuromuscular Re-education:  [x]? ?  See flow sheet :   Rationale: improve coordination, improve balance and increase proprioception  to improve the patients ability to complete ADLS, and decrease falls risk                      With   [] TE   [] TA   [] neuro   [] other: Patient Education: [x] Review HEP    [] Progressed/Changed HEP based on:   [] positioning   [] body mechanics   [] transfers   [] heat/ice application    [] other:      Other Objective/Functional Measures: NA     Pain Level (0-10 scale) post treatment: 2    ASSESSMENT/Changes in Function: Patient responds well to treatment session. Patient responds well to NMR and therapeutic exercises for neck pain. Was educated on how to apply this approach at home. Will progress as tolerated. . No adverse effects were noted from today's treatment session       Patient will continue to benefit from skilled PT services to modify and progress therapeutic interventions, address functional mobility deficits, address ROM deficits, address strength deficits, analyze and address soft tissue restrictions, analyze and cue movement patterns, analyze and modify body mechanics/ergonomics, assess and modify postural abnormalities    []  See Plan of Care  []  See progress note/recertification  []  See Discharge Summary         Progress towards goals / Updated goals:  Short Term Goals: To be accomplished in 4 weeks:                   Patient will report compliance with HEP 1x/day to aid in rehabilitation program.                   Status at Ie: Patient to be instructed in and provided written copy of initial Home Exercise Program at first treatment session.                   Current: Patient instructed in and provided written copy of initial HEP.     9/22/2020                      Patient will increase cervical ROM to WNL in all planes to aid in completion of ADLs.                  Status at IE:  Cervical AROM Comments:pain, area   Forward flexion 42     Extension 51     SB right 25  Increased right cervical pain   SB left  43     Rotation right 44 Increased right cervical pain   Rotation left 23                      Current: Right cervical sidebend improved to 34 degrees with no increase in pain. 9/29/2020     Long Term Goals: To be accomplished in 8 weeks:                   TGLPAIQ will increase cervical strength to 5/5 MMT throughout to aid in completion of ADLs.                    Status at IE:  Cervical Strength Comments:pain, area   Forward flexion 4+     Extension 4     SB right 3+  Increased right cervical pain   SB left  4     Rotation right 3+ Increased right cervical pain   Rotation left 4                      Current: Same as IE                      BMALÁZARO will report pain no greater than 1-2/10 throughout entire day to aid in completion of ADLs.                  Status at [facility] 6-10/10                   Current: Same as IE                      Patent will be able to lift  10 lbs overhead without pain to be able to return to full work duties.                  Status at IE: 0 lbs                   Current: Same as IE                      Patient will improve FOTO (an established functional score where 100 = no disability) score to 60 points to demonstrate improvement in functional status.                    Status at IE: 50                   Current: Same as IE      PLAN  []  Upgrade activities as tolerated     [x]  Continue plan of care  []  Update interventions per flow sheet       []  Discharge due to:_  []  Other:_      Fito Guadalupe, PT, DPT 10/2/2020  11:18 AM    Future Appointments   Date Time Provider Ivan Nails   10/5/2020 11:00 AM Chen Martinez, PT [facility]   10/8/2020  8:00 AM Chen Martinez, PT [facility]   10/12/2020 11:00 AM Chen Martinez, PT [facility]   10/15/2020  8:00 AM Isidoro Chambers, PT, DPT [facility]   10/19/2020 11:00 AM Chen Martinez, PT [facility]   10/22/2020  8:00 AM Isidoro Chambers, PT, DPT [facility]   10/26/2020 11:00 AM Chen Martinez, PT [facility]   10/29/2020  8:00 AM Chen Martinez, PT [facility]

## 2020-10-05 ENCOUNTER — HOSPITAL ENCOUNTER (OUTPATIENT)
Dept: PHYSICAL THERAPY | Age: 40
Discharge: HOME | End: 2020-10-05
Payer: MEDICAID

## 2020-10-05 PROCEDURE — 97110 THERAPEUTIC EXERCISES: CPT

## 2020-10-05 PROCEDURE — 97012 MECHANICAL TRACTION THERAPY: CPT

## 2020-10-05 PROCEDURE — 97112 NEUROMUSCULAR REEDUCATION: CPT

## 2020-10-05 PROCEDURE — 97530 THERAPEUTIC ACTIVITIES: CPT

## 2020-10-05 NOTE — PROGRESS NOTES
PT DAILY TREATMENT NOTE    Patient Name: Lucio Ramos  Date:10/5/2020  : 1980  [x]  Patient  Verified  Payor: BLUE CROSS MEDICAID / Plan: [employer] / Product Type: Managed Care Medicaid /    In time: 4831  Out time: 5105  Total Treatment Time (min): 69  Total Timed Codes (min): 54  1:1 Treatment Time ( only): 52   Visit #: 5 of 16    Treatment Area: Neck pain [M54.2]    SUBJECTIVE  Pain Level (0-10 scale): 4  Any medication changes, allergies to medications, adverse drug reactions, diagnosis change, or new procedure performed?: [x] No    [] Yes (see summary sheet for update)  Subjective functional status/changes:   [] No changes reported  \"Pain is not as intense as it was. But, I had a lot of tingling down the arm this weekend. My low back has also been hurting a lot the past couple days. \"    OBJECTIVE  Modalities Rationale:     decrease edema and decrease pain to improve patient's ability to Complete ADLS                min []? ??  Vasopneumatic Device, press/temp:     15 min [x]? ??  Other: Mechanical cervical traction, 30 lbs intermittent   []??? Skin assessment post-treatment (if applicable):    []???  intact    []? ??  redness- no adverse reaction     []? ??redness - adverse reaction:         19 min Therapeutic Exercise:  [x]? ?? See flow sheet :   Rationale: increase ROM, increase strength and decrease pain to improve the patients ability to complete ADLs     15 min Therapeutic Activity:  [x]? ??  See flow sheet :   Rationale: increase ROM, increase strength and improve coordination  to improve the patients ability to Complete ADLS     15 min Neuromuscular Re-education:  [x]? ??  See flow sheet :   Rationale: improve coordination, improve balance and increase proprioception  to improve the patients ability to complete ADLS, and decrease falls risk        With   [] TE   [] TA   [] neuro   [] other: Patient Education: [x] Review HEP    [] Progressed/Changed HEP based on:   [] positioning   [] body mechanics   [] transfers   [] heat/ice application    [] other:      Other Objective/Functional Measures: NA     Pain Level (0-10 scale) post treatment: 2    ASSESSMENT/Changes in Function: Patient instructed in additional spinal stabilization exercises to address cervical pain and lumbar symptoms at same time. Patient notes marked decrease in radi. Patient responds well to treatment session. No adverse effects were noted from today's treatment session. Patient will continue to benefit from skilled PT services to modify and progress therapeutic interventions, address functional mobility deficits, address ROM deficits, address strength deficits, analyze and address soft tissue restrictions, analyze and cue movement patterns, analyze and modify body mechanics/ergonomics, assess and modify postural abnormalities,  and instruct in home and community integration to attain remaining goals. []  See Plan of Care  []  See progress note/recertification  []  See Discharge Summary         Progress towards goals / Updated goals:  Short Term Goals: To be accomplished in 4 weeks:                   DAVISR will report compliance with HEP 1x/day to aid in rehabilitation program.                   Status at Ie: Patient to be instructed in and provided written copy of initial Home Exercise Program at first treatment session.                   Current: Patient instructed in and provided written copy of initial HEP.     9/22/2020                      Patient will increase cervical ROM to WNL in all planes to aid in completion of ADLs.                    Status at IE:  Cervical AROM Comments:pain, area   Forward flexion 42     Extension 51     SB right 25  Increased right cervical pain   SB left  43     Rotation right 44 Increased right cervical pain   Rotation left 23                      Current: Right cervical sidebend improved to 34 degrees with no increase in pain.     9/29/2020     Long Term Goals: To be accomplished in 8 weeks:                   Patient will increase cervical strength to 5/5 MMT throughout to aid in completion of ADLs.                  Status at IE:  Cervical Strength Comments:pain, area   Forward flexion 4+     Extension 4     SB right 3+  Increased right cervical pain   SB left  4     Rotation right 3+ Increased right cervical pain   Rotation left 4                      Current: Same as IE                      NNVDUIU will report pain no greater than 1-2/10 throughout entire day to aid in completion of ADLs.                  Status at [address] [place] [address] 6-10/10                   Current: Pain now 4/10 at home and decreases to 2/10 after exercises and traction. 10/5/2020                      Patent will be able to lift  10 lbs overhead without pain to be able to return to full work duties.                  Status at IE: 0 lbs                   Current: Same as IE                      Patient will improve FOTO (an established functional score where 100 = no disability) score to 60 points to demonstrate improvement in functional status.                    Status at IE: 50                   Current: Same as IE    PLAN  []  Upgrade activities as tolerated     [x]  Continue plan of care  []  Update interventions per flow sheet       []  Discharge due to:_  []  Other:_      Malina Bateman, PT, DPT 10/5/2020  11:11 AM    Future Appointments   Date Time Provider Ivan Nails   10/8/2020  8:00 AM Clementeen Began, PT [facility]   10/13/2020  4:00 PM Hugh , PT, DPT [facility]   10/15/2020  8:00 AM Hugh , PT, DPT [facility]   10/19/2020 11:00 AM Clementeen Began, PT [facility]   10/22/2020  8:00 AM Hugh , PT, DPT [facility]   10/26/2020 11:00 AM Clementeen Began, PT [facility]   10/29/2020  8:00 AM Clementeen Began, PT [facility]

## 2020-10-08 ENCOUNTER — HOSPITAL ENCOUNTER (OUTPATIENT)
Dept: PHYSICAL THERAPY | Age: 40
Discharge: HOME | End: 2020-10-08
Payer: MEDICAID

## 2020-10-08 PROCEDURE — 97012 MECHANICAL TRACTION THERAPY: CPT

## 2020-10-08 PROCEDURE — 97112 NEUROMUSCULAR REEDUCATION: CPT

## 2020-10-08 PROCEDURE — 97110 THERAPEUTIC EXERCISES: CPT

## 2020-10-08 PROCEDURE — 97530 THERAPEUTIC ACTIVITIES: CPT

## 2020-10-08 NOTE — PROGRESS NOTES
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 Comorbidities: low back and neck pain, hypothyroidism, scoliosis, cervical OA and cervical disc herniation. Medications: Verified on Patient Summary List      ===========================================================================================  Assessment / Summary of Care:  Melanie Escalona is a 36 y.o.  female who notes good reduction of symptoms with combination of carefully supervised exercises and mechanical traction. But, patient continues to note intermittent exacerbation of symptoms as she attempts to perform physically demanding tasks at home. Educating patient further on importance of limiting physically demanding tasks until radicular symptoms are resolved.      Patient will continue to benefit from skilled PT services to modify and progress therapeutic interventions, address functional mobility deficits, address ROM deficits, address strength deficits, analyze and address soft tissue restrictions, analyze and cue movement patterns, analyze and modify body mechanics/ergonomics, assess and modify postural abnormalities,  and instruct in home and community integration to attain remaining goals.    ===========================================================================================    Plan:Continue therapy per initial plan/protocol at a frequency of  2 x per week for 4 weeks    Short Term Goals: To be accomplished in 4 weeks:                   Patient will report compliance with HEP 1x/day to aid in rehabilitation program.                   Status at Ie: Patient to be instructed in and provided written copy of initial Home Exercise Program at first treatment session.                   UFZDSJF: CFZJEGP instructed in and provided written copy of initial HEP.     9/22/2020                      Patient will increase cervical ROM to WNL in all planes to aid in completion of ADLs.                  Status at IE:  Cervical AROM Comments:pain, area   Forward flexion 42     Extension 51     SB right 25  Increased right cervical pain   SB left  43     Rotation right 44 Increased right cervical pain   Rotation left 23                      Current: Right cervical sidebend improved to 34 degrees with no increase in pain.     9/29/2020     Long Term Goals: To be accomplished in 8 weeks:                   Patient will increase cervical strength to 5/5 MMT throughout to aid in completion of ADLs.                  Status at IE:  Cervical Strength Comments:pain, area   Forward flexion 4+     Extension 4     SB right 3+  Increased right cervical pain   SB left  4     Rotation right 3+ Increased right cervical pain   Rotation left 4                      Current: Cervical rotation right and side bend right improved to 4/5 with less pain reported at end range. 10/8/2020                      Patient will report pain no greater than 1-2/10 throughout entire day to aid in completion of ADLs.                  Status at [address] [place] [address] 6-10/10                   Current: Pain now 4/10 at home and decreases to 2/10 after exercises and traction. 10/5/2020                      Patent will be able to lift  10 lbs overhead without pain to be able to return to full work duties.                  Status at IE: 0 lbs                   Current: Patient now able to lift 4 pounds overhead without increased pain.    10/8/2020                      Patient will improve FOTO (an established functional score where 100 = no disability) score to 60 points to demonstrate improvement in functional status.                  Status at IE: 50                   Current: 49    10/8/2020    ===========================================================================================  Subjective: \"I feel better after the therapy. But, I did some housecleaning yesterday and both the neck and low back were much more painful afterwards. \"      Therapist Signature: Nathaniel cMkay PT, DPT Date: 91/5/4749   Re-Certification: NA Time: 6:46 AM       I certify that the above Therapy Services are being furnished while the patient is under my care. I agree with the treatment plan and certify that this therapy is necessary. [] I have read the above and request that my patient continue as recommended. [] I have read the above report and request that my patient continue therapy with the following changes/special instructions: ______________________________________  [] I have read the above report and request that my patient be discharged from therapy    Physician's Signature:____________Date:_________TIME:________    ** Signature, Date and Time must be completed for valid certification **    [facility] at [address]                    Phone: [phone]   Fax: [phone]  .

## 2020-10-08 NOTE — PROGRESS NOTES
PT DAILY TREATMENT NOTE    Patient Name: Farrah Lozano  Date:10/8/2020  : 1980  [x]  Patient  Verified  Payor: BLUE CROSS MEDICAID / Plan: [facility] [employer]ERS PLUS / Product Type: Managed Care Medicaid /    In time: 998  Out time: 767  Total Treatment Time (min): 80  Total Timed Codes (min): 55  1:1 Treatment Time ( only): 50   Visit #: 6 of 16    Treatment Area: Neck pain [M54.2]    SUBJECTIVE  Pain Level (0-10 scale): cervical 5, lumbar 8  Any medication changes, allergies to medications, adverse drug reactions, diagnosis change, or new procedure performed?: [x] No    [] Yes (see summary sheet for update)  Subjective functional status/changes:   [] No changes reported  \"I feel better after the therapy. But, I did some housecleaning yesterday and both the neck and low back were much more painful afterwards. \"    OBJECTIVE    Modalities Rationale:     decrease edema and decrease pain to improve patient's ability to Complete ADLS                min []????  Vasopneumatic Device, press/temp:     15 min [x]????  Other: Mechanical cervical traction, 30 lbs intermittent   []???? Skin assessment post-treatment (if applicable):    []????  intact    []????  redness- no adverse reaction     []????redness - adverse reaction:         19 min Therapeutic Exercise:  [x]? ??? See flow sheet :   Rationale: increase ROM, increase strength and decrease pain to improve the patients ability to complete ADLs     15 min Therapeutic Activity:  [x]? ???  See flow sheet :   Rationale: increase ROM, increase strength and improve coordination  to improve the patients ability to Complete ADLS     15 min Neuromuscular Re-education:  [x]? ???  See flow sheet :   Rationale: improve coordination, improve balance and increase proprioception  to improve the patients ability to complete ADLS, and decrease falls risk      With   [] TE   [] TA   [] neuro   [] other: Patient Education: [x] Review HEP    [] Progressed/Changed HEP based on:   [] positioning   [] body mechanics   [] transfers   [] heat/ice application    [] other:      Other Objective/Functional Measures: NA     Pain Level (0-10 scale) post treatment: 3.5-4.0    ASSESSMENT/Changes in Function: Patient notes good reduction of symptoms with combination of carefully supervised exercises and mechanical traction. But, patient continues to note intermittent exacerbation of symptoms as she attempts to perform physically demanding tasks at home. Educating patient further on importance of limiting physically demanding tasks until radicular symptoms are resolved. Patient responds well to treatment session. No adverse effects were noted from today's treatment session. Patient will continue to benefit from skilled PT services to modify and progress therapeutic interventions, address functional mobility deficits, address ROM deficits, address strength deficits, analyze and address soft tissue restrictions, analyze and cue movement patterns, analyze and modify body mechanics/ergonomics, assess and modify postural abnormalities,  and instruct in home and community integration to attain remaining goals. []  See Plan of Care  [x]  See progress note/recertification  []  See Discharge Summary         Progress towards goals / Updated goals:  Short Term Goals: To be accomplished in 4 weeks:                   NTRPEQL will report compliance with HEP 1x/day to aid in rehabilitation program.                   Status at Ie: Patient to be instructed in and provided written copy of initial Home Exercise Program at first treatment session.                   Current: Patient instructed in and provided written copy of initial HEP.     9/22/2020                      Patient will increase cervical ROM to WNL in all planes to aid in completion of ADLs.                    Status at IE:  Cervical AROM Comments:pain, area   Forward flexion 42     Extension 51     SB right 25  Increased right cervical pain SB left  43     Rotation right 44 Increased right cervical pain   Rotation left 23                      Current: Right cervical sidebend improved to 34 degrees with no increase in pain.     9/29/2020     Long Term Goals: To be accomplished in 8 weeks:                   Patient will increase cervical strength to 5/5 MMT throughout to aid in completion of ADLs.                  Status at IE:  Cervical Strength Comments:pain, area   Forward flexion 4+     Extension 4     SB right 3+  Increased right cervical pain   SB left  4     Rotation right 3+ Increased right cervical pain   Rotation left 4                      Current: Cervical rotation right and side bend right improved to 4/5 with less pain reported at end range. 10/8/2020                      Patient will report pain no greater than 1-2/10 throughout entire day to aid in completion of ADLs.                  Status at [address] [place] [address] 6-10/10                   Current: Pain now 4/10 at home and decreases to 2/10 after exercises and traction. 10/5/2020                      Patent will be able to lift  10 lbs overhead without pain to be able to return to full work duties.                  Status at IE: 0 lbs                   Current: Patient now able to lift 4 pounds overhead without increased pain. 10/8/2020                      Patient will improve FOTO (an established functional score where 100 = no disability) score to 60 points to demonstrate improvement in functional status.                    Status at IE: 48                   Current: 49    10/8/2020      PLAN  []  Upgrade activities as tolerated     [x]  Continue plan of care  []  Update interventions per flow sheet       []  Discharge due to:_  []  Other:_      Aditi Daniels PT, DPT 10/8/2020  8:08 AM    Future Appointments   Date Time Provider Ivan Nails   10/13/2020  4:00 PM Chela Branch PT, DPT [facility]   10/15/2020  8:00 AM Chela Branch PT, DPT [facility]   10/19/2020 11:00 AM Landon Jack Nguyễn [facility]   10/22/2020  8:00 AM Francisco Quinteros, PT, DPT [facility]   10/26/2020 11:00 AM Serena De Souza, PT [facility]   10/29/2020  8:00 AM Papito Navarrete, PT [facility]

## 2020-10-12 ENCOUNTER — APPOINTMENT (OUTPATIENT)
Dept: PHYSICAL THERAPY | Age: 40
End: 2020-10-12
Payer: MEDICAID

## 2020-10-13 ENCOUNTER — HOSPITAL ENCOUNTER (OUTPATIENT)
Dept: PHYSICAL THERAPY | Age: 40
Discharge: HOME | End: 2020-10-13
Payer: MEDICAID

## 2020-10-13 PROCEDURE — 97530 THERAPEUTIC ACTIVITIES: CPT | Performed by: PHYSICAL THERAPIST

## 2020-10-13 PROCEDURE — 97012 MECHANICAL TRACTION THERAPY: CPT | Performed by: PHYSICAL THERAPIST

## 2020-10-13 PROCEDURE — 97110 THERAPEUTIC EXERCISES: CPT | Performed by: PHYSICAL THERAPIST

## 2020-10-13 PROCEDURE — 97112 NEUROMUSCULAR REEDUCATION: CPT | Performed by: PHYSICAL THERAPIST

## 2020-10-13 NOTE — PROGRESS NOTES
PT DAILY TREATMENT NOTE    Patient Name: Julia Rodriguez  Date:10/13/2020  : 1980  [x]  Patient  Verified  Payor: BLUE CROSS MEDICAID / Plan: Barnie Krabbe / Product Type: Managed Care Medicaid /    In time:3:43  Out time:4:45  Total Treatment Time (min): 62  Total Timed Codes (min): 62  1:1 Treatment Time ( [address] only): 52   Visit #: 7 of 16    Treatment Area: Neck pain [M54.2]    SUBJECTIVE  Pain Level (0-10 scale): 4.5  Any medication changes, allergies to medications, adverse drug reactions, diagnosis change, or new procedure performed?: [x] No    [] Yes (see summary sheet for update)  Subjective functional status/changes:   [] No changes reported  Feels okay.  The neck was hurting yesterday though    OBJECTIVE    Modalities Rationale:     decrease edema and decrease pain to improve patient's ability to Complete ADLS   min []  Vasopneumatic Device, press/temp:    15 min [x]  Other: Mechanical cervical traction, 30 lbs intermittent   [] Skin assessment post-treatment (if applicable):    []  intact    []  redness- no adverse reaction     []redness - adverse reaction:          17 min Therapeutic Exercise:  [x] See flow sheet :   Rationale: increase ROM, increase strength and decrease pain to improve the patients ability to complete ADLs    15 min Therapeutic Activity:  [x]  See flow sheet :   Rationale: increase ROM, increase strength and improve coordination  to improve the patients ability to Complete ADLS     15 min Neuromuscular Re-education:  [x]  See flow sheet :   Rationale: improve coordination, improve balance and increase proprioception  to improve the patients ability to complete ADLS, and decrease falls risk       With   [] TE   [] TA   [] neuro   [] other: Patient Education: [x] Review HEP    [] Progressed/Changed HEP based on:   [] positioning   [] body mechanics   [] transfers   [] heat/ice application    [] other:      Other Objective/Functional Measures: NA     Pain Level (0-10 scale) post treatment: 4    ASSESSMENT/Changes in Function: Patient responds well to treatment session. Patient has minimal difficulty with exercises as prescribed. Will progress as tolerated. . No adverse effects were noted from today's treatment session       Patient will continue to benefit from skilled PT services to modify and progress therapeutic interventions, address functional mobility deficits, address ROM deficits, address strength deficits, analyze and address soft tissue restrictions, analyze and cue movement patterns, analyze and modify body mechanics/ergonomics, assess and modify postural abnormalities    []  See Plan of Care  []  See progress note/recertification  []  See Discharge Summary         Progress towards goals / Updated goals:  Short Term Goals: To be accomplished in 4 weeks:                   Patient will report compliance with HEP 1x/day to aid in rehabilitation program.                   Status at Ie: Patient to be instructed in and provided written copy of initial Home Exercise Program at first treatment session.                   Current: Patient instructed in and provided written copy of initial HEP.     9/22/2020                      Patient will increase cervical ROM to WNL in all planes to aid in completion of ADLs.                  Status at IE:  Cervical AROM Comments:pain, area   Forward flexion 42     Extension 51     SB right 25  Increased right cervical pain   SB left  43     Rotation right 44 Increased right cervical pain   Rotation left 23                      Current: Right cervical sidebend improved to 34 degrees with no increase in pain.     9/29/2020     Long Term Goals: To be accomplished in 8 weeks:                   Patient will increase cervical strength to 5/5 MMT throughout to aid in completion of ADLs.                    Status at IE:  Cervical Strength Comments:pain, area   Forward flexion 4+     Extension 4     SB right 3+  Increased right cervical pain   SB left  4     Rotation right 3+ Increased right cervical pain   Rotation left 4                      Current: Cervical rotation right and side bend right improved to 4/5 with less pain reported at end range. 10/8/2020                      Patient will report pain no greater than 1-2/10 throughout entire day to aid in completion of ADLs.                  Status at [address] [place] [address] 6-10/10                   Current: Pain now 4/10 at home and decreases to 2/10 after exercises and traction.   10/5/2020                      Patent will be able to lift  10 lbs overhead without pain to be able to return to full work duties.                  Status at IE: 0 lbs                   Current: Patient now able to lift 4 pounds overhead without increased pain. 10/8/2020                      Patient will improve FOTO (an established functional score where 100 = no disability) score to 60 points to demonstrate improvement in functional status.                    Status at IE: 50                   Current: 49    10/8/2020      PLAN  []  Upgrade activities as tolerated     [x]  Continue plan of care  []  Update interventions per flow sheet       []  Discharge due to:_  []  Other:_      Ada Figueroa PT, DPT 10/13/2020  3:43 PM    Future Appointments   Date Time Provider Ivan Nails   10/13/2020  4:00 PM Leroy Ellis PT, DPT [facility]   10/15/2020  8:00 AM Leroy Ellis PT, DPT [facility]   10/19/2020 11:00 AM Patricio Palacios PT [facility]   10/22/2020  8:00 AM Leroy Ellis PT, DPT [facility]   10/26/2020 11:00 AM Patricio Palacios PT [facility]   10/29/2020  8:00 AM Patricio Palacios PT [facility]

## 2020-10-15 ENCOUNTER — HOSPITAL ENCOUNTER (OUTPATIENT)
Dept: PHYSICAL THERAPY | Age: 40
Discharge: HOME | End: 2020-10-15
Payer: MEDICAID

## 2020-10-15 PROCEDURE — 97112 NEUROMUSCULAR REEDUCATION: CPT | Performed by: PHYSICAL THERAPIST

## 2020-10-15 PROCEDURE — 97012 MECHANICAL TRACTION THERAPY: CPT | Performed by: PHYSICAL THERAPIST

## 2020-10-15 PROCEDURE — 97530 THERAPEUTIC ACTIVITIES: CPT | Performed by: PHYSICAL THERAPIST

## 2020-10-15 PROCEDURE — 97110 THERAPEUTIC EXERCISES: CPT | Performed by: PHYSICAL THERAPIST

## 2020-10-15 NOTE — PROGRESS NOTES
PT DAILY TREATMENT NOTE    Patient Name: Alexys Betancourt  Date:10/15/2020  : 1980  [x]  Patient  Verified  Payor: BLUE CROSS MEDICAID / Plan: Damari Pu / Product Type: Managed Care Medicaid /    In time:8:05  Out time:9:07  Total Treatment Time (min): 67  Total Timed Codes (min): 52  1:1 Treatment Time ([facility] only): 46   Visit #: 8 of 16    Treatment Area: Neck pain [M54.2]    SUBJECTIVE  Pain Level (0-10 scale): 3  Any medication changes, allergies to medications, adverse drug reactions, diagnosis change, or new procedure performed?: [x] No    [] Yes (see summary sheet for update)  Subjective functional status/changes:   [] No changes reported  Feels better today. No new issues. The low back hurts more today. OBJECTIVE    Modalities Rationale:     decrease edema and decrease pain to improve patient's ability to Complete ADLS   min -  Vasopneumatic Device, press/temp:    15 min X  Other: Mechanical cervical traction, 30 lbs intermittent   - Skin assessment post-treatment (if applicable):    -  intact    -  redness- no adverse reaction     -redness - adverse reaction:          22 min Therapeutic Exercise:  [x]? See flow sheet :   Rationale: increase ROM, increase strength and decrease pain to improve the patients ability to complete ADLs     15 min Therapeutic Activity:  [x]? See flow sheet :   Rationale: increase ROM, increase strength and improve coordination  to improve the patients ability to Complete ADLS     15 min Neuromuscular Re-education:  [x]?   See flow sheet :   Rationale: improve coordination, improve balance and increase proprioception  to improve the patients ability to complete ADLS, and decrease falls risk      With   [] TE   [] TA   [] neuro   [] other: Patient Education: [x] Review HEP    [] Progressed/Changed HEP based on:   [] positioning   [] body mechanics   [] transfers   [] heat/ice application    [] other:      Other Objective/Functional Measures: NA     Pain Level (0-10 scale) post treatment: 3    ASSESSMENT/Changes in Function: Patient responds well to treatment session. Patient has minimal difficulty with exercises as prescribed. No adverse effects were noted from today's treatment session       Patient will continue to benefit from skilled PT services to modify and progress therapeutic interventions, address functional mobility deficits, address ROM deficits, address strength deficits, analyze and address soft tissue restrictions, analyze and cue movement patterns, analyze and modify body mechanics/ergonomics, assess and modify postural abnormalities,     []  See Plan of Care  []  See progress note/recertification  []  See Discharge Summary         Progress towards goals / Updated goals:  Short Term Goals: To be accomplished in 4 weeks:                   Patient will report compliance with HEP 1x/day to aid in rehabilitation program.                   Status at Ie: Patient to be instructed in and provided written copy of initial Home Exercise Program at first treatment session.                   Current: Patient instructed in and provided written copy of initial HEP.     9/22/2020                      Patient will increase cervical ROM to WNL in all planes to aid in completion of ADLs.                  Status at IE:  Cervical AROM Comments:pain, area   Forward flexion 42     Extension 51     SB right 25  Increased right cervical pain   SB left  43     Rotation right 44 Increased right cervical pain   Rotation left 23                      Current: Right cervical sidebend improved to 34 degrees with no increase in pain.     9/29/2020     Long Term Goals: To be accomplished in 8 weeks:                   Patient will increase cervical strength to 5/5 MMT throughout to aid in completion of ADLs.                    Status at IE:  Cervical Strength Comments:pain, area   Forward flexion 4+     Extension 4     SB right 3+  Increased right cervical pain   SB left  4     Rotation right 3+ Increased right cervical pain   Rotation left 4                      Current: Cervical rotation right and side bend right improved to 4/5 with less pain reported at end range.  10/8/2020                      Patient will report pain no greater than 1-2/10 throughout entire day to aid in completion of ADLs.                  Status at [address] [place] [address] 6-10/10                   Current: Pain now 4/10 at home and decreases to 2/10 after exercises and traction.   10/5/2020                      Patent will be able to lift  10 lbs overhead without pain to be able to return to full work duties.                  Status at IE: 0 lbs                   Current: Patient now able to lift 4 pounds overhead without increased pain.   10/8/2020                      Patient will improve FOTO (an established functional score where 100 = no disability) score to 60 points to demonstrate improvement in functional status.                    Status at IE: 48                   Current: 52    10/8/2020      PLAN  []  Upgrade activities as tolerated     [x]  Continue plan of care  []  Update interventions per flow sheet       []  Discharge due to:_  []  Other:_      Kareem Steele, PT, DPT 10/15/2020  8:18 AM    Future Appointments   Date Time Provider Ivan Nails   10/19/2020 11:00 AM Pritesh Sosa, PT [facility]   10/22/2020  8:00 AM Nidia Schreiber, PT, DPT [facility]   10/26/2020 11:00 AM Pritesh Sosa, PT [facility]   10/29/2020  8:00 AM Pritesh Sosa, PT [facility]

## 2020-10-19 ENCOUNTER — HOSPITAL ENCOUNTER (OUTPATIENT)
Dept: PHYSICAL THERAPY | Age: 40
Discharge: HOME | End: 2020-10-19
Payer: MEDICAID

## 2020-10-19 PROCEDURE — 97530 THERAPEUTIC ACTIVITIES: CPT

## 2020-10-19 PROCEDURE — 97110 THERAPEUTIC EXERCISES: CPT

## 2020-10-19 PROCEDURE — 97012 MECHANICAL TRACTION THERAPY: CPT

## 2020-10-19 PROCEDURE — 97112 NEUROMUSCULAR REEDUCATION: CPT

## 2020-10-19 NOTE — PROGRESS NOTES
PT DAILY TREATMENT NOTE    Patient Name: Ivy Hussein  Date:10/19/2020  : 1980  [x]  Patient  Verified  Payor: BLUE CROSS MEDICAID / Plan: [employer] / Product Type: Managed Care Medicaid /    In time:   Out time: 6083  Total Treatment Time (min): 61  Total Timed Codes (min): 61  1:1 Treatment Time ( only): 61   Visit #: 9 of 16    Treatment Area: Neck pain [M54.2]    SUBJECTIVE  Pain Level (0-10 scale): 4  Any medication changes, allergies to medications, adverse drug reactions, diagnosis change, or new procedure performed?: [x] No    [] Yes (see summary sheet for update)  Subjective functional status/changes:   [] No changes reported  \"I had a pretty physically demanding weekend caring for the kids, so I did end up having pain shooting into my elbow and little finger over the weekend. \"    OBJECTIVE     Modalities Rationale:     decrease edema and decrease pain to improve patient's ability to Complete ADLS           min -  Vasopneumatic Device, press/temp:     15 min X  Other: Mechanical cervical traction, 30 lbs intermittent   - Skin assessment post-treatment (if applicable):    -  intact    -  redness- no adverse reaction     -redness - adverse reaction:            16 min Therapeutic Exercise:  [x]? ? See flow sheet :   Rationale: increase ROM, increase strength and decrease pain to improve the patients ability to complete ADLs     15 min Therapeutic Activity:  [x]? ?  See flow sheet :   Rationale: increase ROM, increase strength and improve coordination  to improve the patients ability to Complete ADLS     15 min Neuromuscular Re-education:  [x]? ?  See flow sheet :   Rationale: improve coordination, improve balance and increase proprioception  to improve the patients ability to complete ADLS, and decrease falls risk                    With   [] TE   [] TA   [] neuro   [] other: Patient Education: [x] Review HEP    [] Progressed/Changed HEP based on:   [] positioning [] body mechanics   [] transfers   [] heat/ice application    [] other:      Other Objective/Functional Measures: NA     Pain Level (0-10 scale) post treatment: 3    ASSESSMENT/Changes in Function: Patient continues to intermittently perform activities that increase radicular symptoms. Continuing to educate and reinforce to patient to be more attentive in limiting and avoiding when possible activities that increase radicular symptoms. Patient responds well to treatment session. No adverse effects were noted from today's treatment session. Patient will continue to benefit from skilled PT services to modify and progress therapeutic interventions, address functional mobility deficits, address ROM deficits, address strength deficits, analyze and address soft tissue restrictions, analyze and cue movement patterns, analyze and modify body mechanics/ergonomics, assess and modify postural abnormalities,  and instruct in home and community integration to attain remaining goals. []  See Plan of Care  []  See progress note/recertification  []  See Discharge Summary         Progress towards goals / Updated goals:  Short Term Goals: To be accomplished in 4 weeks:                   EUQCPBE will report compliance with HEP 1x/day to aid in rehabilitation program.                   Status at Ie: Patient to be instructed in and provided written copy of initial Home Exercise Program at first treatment session.                   Current: Patient instructed in and provided written copy of initial HEP.     9/22/2020                      Patient will increase cervical ROM to WNL in all planes to aid in completion of ADLs.                    Status at IE:  Cervical AROM Comments:pain, area   Forward flexion 42     Extension 51     SB right 25  Increased right cervical pain   SB left  43     Rotation right 44 Increased right cervical pain   Rotation left 23                      Current: Flexion 55, extension 53, SB right 36, SB left 43, rotation right 48, rotation left 40.     10/19/2020     Long Term Goals: To be accomplished in 8 weeks:                   Patient will increase cervical strength to 5/5 MMT throughout to aid in completion of ADLs.                  Status at IE:  Cervical Strength Comments:pain, area   Forward flexion 4+     Extension 4     SB right 3+  Increased right cervical pain   SB left  4     Rotation right 3+ Increased right cervical pain   Rotation left 4                      Current: Cervical rotation right and side bend right improved to 4/5 with less pain reported at end range.  10/8/2020                      Patient will report pain no greater than 1-2/10 throughout entire day to aid in completion of ADLs.                  Status at [address] [place] [address] 6-10/10                   Current: Pain now 4/10 at home and decreases to 2/10 after exercises and traction.   10/5/2020                      Patent will be able to lift  10 lbs overhead without pain to be able to return to full work duties.                  Status at IE: 0 lbs                   Current: Patient now able to lift 4 pounds overhead without increased pain.   10/8/2020                      Patient will improve FOTO (an established functional score where 100 = no disability) score to 60 points to demonstrate improvement in functional status.                    Status at IE: 48                   Current: 52    10/8/2020      PLAN  []  Upgrade activities as tolerated     [x]  Continue plan of care  []  Update interventions per flow sheet       []  Discharge due to:_  []  Other:_      Francisco Arias, PT, DPT 10/19/2020  11:39 AM    Future Appointments   Date Time Provider Ivan Nails   10/22/2020  8:00 AM Francisco Quinteros PT, DPT [facility]   10/26/2020 11:00 AM Serena De Souza, PT [facility]   10/29/2020  8:00 AM Serena De Souza PT [facility]

## 2020-10-22 ENCOUNTER — HOSPITAL ENCOUNTER (OUTPATIENT)
Dept: PHYSICAL THERAPY | Age: 40
Discharge: HOME | End: 2020-10-22
Payer: MEDICAID

## 2020-10-22 PROCEDURE — 97530 THERAPEUTIC ACTIVITIES: CPT

## 2020-10-22 PROCEDURE — 97012 MECHANICAL TRACTION THERAPY: CPT

## 2020-10-22 PROCEDURE — 97110 THERAPEUTIC EXERCISES: CPT

## 2020-10-22 PROCEDURE — 97112 NEUROMUSCULAR REEDUCATION: CPT

## 2020-10-22 NOTE — PROGRESS NOTES
PT DAILY TREATMENT NOTE - [facility]     Patient Name: Mark Greenfield  Date:10/22/2020  : 1980  [x]  Patient  Verified  Payor: BLUE CROSS MEDICAID / Plan: [employer] / Product Type: Managed Care Medicaid /    In time:0800  Out time:09  Total Treatment Time (min): 65  Total Timed Codes (min): 50   1:1 Treatment Time ( [address] only): 50   Visit #: 10 of 16    Treatment Area: Neck pain [M54.2]    SUBJECTIVE  Pain Level (0-10 scale): 3 neck 6 back  Any medication changes, allergies to medications, adverse drug reactions, diagnosis change, or new procedure performed?: [x] No    [] Yes (see summary sheet for update)  Subjective functional status/changes:   [] No changes reported    Patient reports that her neck is feeling better but her low back is very sore.      OBJECTIVE    Modalities Rationale:     decrease edema and decrease pain to improve patient's ability to Complete ADLS              15 min X  Other: Mechanical cervical traction, 30 lbs intermittent   - Skin assessment post-treatment (if applicable):    -  intact    -  redness- no adverse reaction     -redness - adverse reaction:        20 min Therapeutic Exercise:  [x] See flow sheet :   Rationale: increase ROM, increase strength and decrease pain to improve the patients ability to complete ADLs    20 min Therapeutic Activity:  [x]  See flow sheet :   Rationale: increase ROM, increase strength and improve coordination  to improve the patients ability to complete ADLs     10 min Neuromuscular Re-education:  [x]  See flow sheet :   Rationale: improve coordination, improve balance and increase proprioception  to improve the patients ability to complete ADLs            With   [x] TE   [] TA   [] neuro   [] other: Patient Education: [x] Review HEP    [] Progressed/Changed HEP based on:   [] positioning   [] body mechanics   [] transfers   [] heat/ice application    [] other:      Other Objective/Functional Measures: Na     Pain Level (0-10 scale) post treatment: 3 neck 3 back    ASSESSMENT/Changes in Function: Patient responds well to treatment session. Patient was challenged with exercises prescribed secondary to low back pain. She had a reduction in symptoms with lumbar mobility activity with physio ball. No adverse effects were noted from today's treatment session    Patient will continue to benefit from skilled PT services to modify and progress therapeutic interventions, address functional mobility deficits, address ROM deficits, address strength deficits, analyze and address soft tissue restrictions, analyze and cue movement patterns, analyze and modify body mechanics/ergonomics, assess and modify postural abnormalities, and instruct in home and community integration to attain remaining goals. []  See Plan of Care  []  See progress note/recertification  []  See Discharge Summary         Progress towards goals / Updated goals:  Short Term Goals: To be accomplished in 4 weeks:                   ILBNWSS will report compliance with HEP 1x/day to aid in rehabilitation program.                   Status at Ie: Patient to be instructed in and provided written copy of initial Home Exercise Program at first treatment session.                   Current: Patient instructed in and provided written copy of initial HEP.     9/22/2020                      Patient will increase cervical ROM to WNL in all planes to aid in completion of ADLs.                    Status at IE:  Cervical AROM Comments:pain, area   Forward flexion 42     Extension 51     SB right 25  Increased right cervical pain   SB left  43     Rotation right 44 Increased right cervical pain   Rotation left 23                      Current: Flexion 55, extension 53, SB right 36, SB left 43, rotation right 48, rotation left 40.     10/19/2020     Long Term Goals: To be accomplished in 8 weeks:                   Patient will increase cervical strength to 5/5 MMT throughout to aid in completion of ADLs.                  Status at IE:  Cervical Strength Comments:pain, area   Forward flexion 4+     Extension 4     SB right 3+  Increased right cervical pain   SB left  4     Rotation right 3+ Increased right cervical pain   Rotation left 4                      Current: Cervical rotation right and side bend right improved to 4/5 with less pain reported at end range.  10/8/2020                      Patient will report pain no greater than 1-2/10 throughout entire day to aid in completion of ADLs.                  Status at [address] [place] [address] 6-10/10                   Current: Pain now 4/10 at home and decreases to 2/10 after exercises and traction.   10/5/2020                      Patent will be able to lift  10 lbs overhead without pain to be able to return to full work duties.                  Status at IE: 0 lbs                   Current: Patient now able to lift 4 pounds overhead without increased pain.   10/8/2020                      Patient will improve FOTO (an established functional score where 100 = no disability) score to 60 points to demonstrate improvement in functional status.                    Status at IE: 48                   Current: 52    10/8/2020    PLAN  []  Upgrade activities as tolerated     [x]  Continue plan of care  []  Update interventions per flow sheet       []  Discharge due to:_  []  Other:_      David Pierson, PT, DPT 10/22/2020  8:05 AM    Future Appointments   Date Time Provider Ivan Nails   10/26/2020 11:00 AM Karlene Jensen, PT [facility]   10/29/2020  8:00 AM Nathaniel Navarrete, PT [facility]

## 2020-10-26 ENCOUNTER — HOSPITAL ENCOUNTER (OUTPATIENT)
Dept: PHYSICAL THERAPY | Age: 40
Discharge: HOME | End: 2020-10-26
Payer: MEDICAID

## 2020-10-26 PROCEDURE — 97112 NEUROMUSCULAR REEDUCATION: CPT | Performed by: PHYSICAL THERAPIST

## 2020-10-26 PROCEDURE — 97110 THERAPEUTIC EXERCISES: CPT | Performed by: PHYSICAL THERAPIST

## 2020-10-26 PROCEDURE — 97012 MECHANICAL TRACTION THERAPY: CPT | Performed by: PHYSICAL THERAPIST

## 2020-10-26 PROCEDURE — 97530 THERAPEUTIC ACTIVITIES: CPT | Performed by: PHYSICAL THERAPIST

## 2020-10-26 NOTE — PROGRESS NOTES
PT DAILY TREATMENT NOTE    Patient Name: Mickie Rodriguez  Date:10/26/2020  : 1980  [x]  Patient  Verified  Payor: BLUE CROSS MEDICAID / Plan: Priyanka Acosta / Product Type: Managed Care Medicaid /    In time:1100  Out time:1200  Total Treatment Time (min): 60  Total Timed Codes (min): 45  1:1 Treatment Time ( only): 45  Visit #: 11 of 16    Treatment Area: Neck pain [M54.2]    SUBJECTIVE  Pain Level (0-10 scale): 3 and some rad sx into right arm , 5 low back   Any medication changes, allergies to medications, adverse drug reactions, diagnosis change, or new procedure performed?: [x] No    [] Yes (see summary sheet for update)  Subjective functional status/changes:   [] No changes reported  Pt throwing football over weekend feels may have over extended her shoulder a little sore , neck pain is less , still some problems looking up and turning head to see blind spots , otherwise feeling very strong. OBJECTIVE  Modalities Rationale:     decrease edema and decrease pain to improve patient's ability to Complete ADLS              15 min X  Other: Mechanical cervical traction, 30 lbs intermittent   - Skin assessment post-treatment (if applicable):    -  intact    -  redness- no adverse reaction     -redness - adverse reaction:         20 min Therapeutic Exercise:  [x]? See flow sheet :   Rationale: increase ROM, increase strength and decrease pain to improve the patients ability to complete ADLs     15 min Therapeutic Activity:  [x]? See flow sheet :   Rationale: increase ROM, increase strength and improve coordination  to improve the patients ability to complete ADLs     10 min Neuromuscular Re-education:  [x]?   See flow sheet :   Rationale: improve coordination, improve balance and increase proprioception  to improve the patients ability to complete ADLs    With   [] TE   [] TA   [] neuro   [] other: Patient Education: [x] Review HEP    [] Progressed/Changed HEP based on:   [] positioning   [] body mechanics   [] transfers   [] heat/ice application    [] other:      Other Objective/Functional Measures: see ex grid     Pain Level (0-10 scale) post treatment: 3    ASSESSMENT/Changes in Function: pt with good control with shoulder ex and smooth motion with shoulder abd with back to wall , good mobility with swiss ball roll outs full motion no increase in rad sx or pain , noted pt tendency to stand with left>R  foot/leg significantly internal rotated, pt with difficulty with core control during bird dog shifts and arches excessively without cues verbal and tactile but with cues able to control better . Able to relax on traction even fall asleep . No adverse affect with therapy     Patient will continue to benefit from skilled PT services to modify and progress therapeutic interventions, address functional mobility deficits, address ROM deficits, address strength deficits, analyze and address soft tissue restrictions, analyze and cue movement patterns, analyze and modify body mechanics/ergonomics and assess and modify postural abnormalities to attain remaining goals. [x]  See Plan of Care  []  See progress note/recertification  []  See Discharge Summary         Progress towards goals / Updated goals:  Short Term Goals: To be accomplished in 4 weeks:                   RHFAPJK will report compliance with HEP 1x/day to aid in rehabilitation program.                   Status at Ie: Patient to be instructed in and provided written copy of initial Home Exercise Program at first treatment session.                   Current: Patient instructed in and provided written copy of initial HEP.     1/73/1296, pt reports doing HEP 10/26 /2020                       Patient will increase cervical ROM to WNL in all planes to aid in completion of ADLs.                    Status at IE:  Cervical AROM Comments:pain, area   Forward flexion 42     Extension 51     SB right 25  Increased right cervical pain   SB left  43     Rotation right 44 Increased right cervical pain   Rotation left 23                      Current: Flexion 55, extension 53, SB right 36, SB left 43, rotation right 48, rotation left 40.     10/19/2020     Long Term Goals: To be accomplished in 8 weeks:                   Patient will increase cervical strength to 5/5 MMT throughout to aid in completion of ADLs.                  Status at IE:  Cervical Strength Comments:pain, area   Forward flexion 4+     Extension 4     SB right 3+  Increased right cervical pain   SB left  4     Rotation right 3+ Increased right cervical pain   Rotation left 4                      Current: Cervical rotation right and side bend right improved to 4/5 with less pain reported at end range.  10/8/2020                      Patient will report pain no greater than 1-2/10 throughout entire day to aid in completion of ADLs.                  Status at [address] [place] [address] 6-10/10                   Current: Pain now 4/10 at home and decreases to 2/10 after exercises and traction.   10/5/2020, continueing approx same                       Patent will be able to lift  10 lbs overhead without pain to be able to return to full work duties.                  Status at IE: 0 lbs                   Current: Patient now able to lift 4 pounds overhead without increased pain.   10/8/2020                      Patient will improve FOTO (an established functional score where 100 = no disability) score to 60 points to demonstrate improvement in functional status.                    Status at IE: 48                   Current: 49    10/8/2020         PLAN  [x]  Upgrade activities as tolerated     [x]  Continue plan of care  []  Update interventions per flow sheet       []  Discharge due to:_  []  Other:_      Carl Akbar, PT 10/26/2020  11:11 AM    Future Appointments   Date Time Provider Ivan Nails   10/29/2020  8:00 AM Betito Navarrete [facility]

## 2020-10-29 ENCOUNTER — HOSPITAL ENCOUNTER (OUTPATIENT)
Dept: PHYSICAL THERAPY | Age: 40
Discharge: HOME | End: 2020-10-29
Payer: MEDICAID

## 2020-10-29 PROCEDURE — 97110 THERAPEUTIC EXERCISES: CPT

## 2020-10-29 PROCEDURE — 97530 THERAPEUTIC ACTIVITIES: CPT

## 2020-10-29 PROCEDURE — 97112 NEUROMUSCULAR REEDUCATION: CPT

## 2020-10-29 PROCEDURE — 97012 MECHANICAL TRACTION THERAPY: CPT

## 2020-10-29 NOTE — PROGRESS NOTES
PT DAILY TREATMENT NOTE    Patient Name: Bernabe Grande  Date:10/29/2020  : 1980  [x]  Patient  Verified  Payor: BLUE CROSS MEDICAID / Plan: Eleanor Cobian / Product Type: Managed Care Medicaid /    In time: 081  Out time: 908  Total Treatment Time (min): 66  Total Timed Codes (min): 51  1:1 Treatment Time (MC only): 51   Visit #: 12 of 16    Treatment Area: Neck pain [M54.2]    SUBJECTIVE  Pain Level (0-10 scale): cervical 4, lumbar 6  Any medication changes, allergies to medications, adverse drug reactions, diagnosis change, or new procedure performed?: [x] No    [] Yes (see summary sheet for update)  Subjective functional status/changes:   [] No changes reported  \"My neck is bothering me today, but my low back is even worse. I took it easy yesterday so I am not sure why they are worse today. It kind of upsets me. \"    OBJECTIVE     Modalities Rationale:     decrease edema and decrease pain to improve patient's ability to Complete ADLS                min -  Vasopneumatic Device, press/temp:     15 min X  Other: Mechanical cervical traction, 30 lbs intermittent   - Skin assessment post-treatment (if applicable):    -  intact    -  redness- no adverse reaction     -redness - adverse reaction:            21 min Therapeutic Exercise:  [x]? ?? See flow sheet :   Rationale: increase ROM, increase strength and decrease pain to improve the patients ability to complete ADLs     15 min Therapeutic Activity:  [x]? ??  See flow sheet :   Rationale: increase ROM, increase strength and improve coordination  to improve the patients ability to Complete ADLS     15 min Neuromuscular Re-education:  [x]? ??  See flow sheet :   Rationale: improve coordination, improve balance and increase proprioception  to improve the patients ability to complete ADLS, and decrease falls risk                    With   [] TE   [] TA   [] neuro   [] other: Patient Education: [x] Review HEP    [] Progressed/Changed HEP based on:   [] positioning   [] body mechanics   [] transfers   [] heat/ice application    [] other:      Other Objective/Functional Measures: NA     Pain Level (0-10 scale) post treatment: cervical 3, lumbar 5    ASSESSMENT/Changes in Function: Patient instructed in use of theracane and ball for self soft tissue mobilization. Also added further cervical and lumbar flexibility and stabilization exercises to HEP. Provided written copy of new exercises and level 5 (plum) theraband for home use. Patient responds well to treatment session. No adverse effects were noted from today's treatment session. Patient will continue to benefit from skilled PT services to modify and progress therapeutic interventions, address functional mobility deficits, address ROM deficits, address strength deficits, analyze and address soft tissue restrictions, analyze and cue movement patterns, analyze and modify body mechanics/ergonomics, assess and modify postural abnormalities,  and instruct in home and community integration to attain remaining goals. []  See Plan of Care  []  See progress note/recertification  []  See Discharge Summary         Progress towards goals / Updated goals:  Short Term Goals: To be accomplished in 4 weeks:                   Patient will report compliance with HEP 1x/day to aid in rehabilitation program.                   Status at Ie: Patient to be instructed in and provided written copy of initial Home Exercise Program at first treatment session.                   Current: Added self soft tissue mobilization and next level stabilization and mobilization exercises to HEP.    10/29/2020                       Patient will increase cervical ROM to WNL in all planes to aid in completion of ADLs.                    Status at IE:  Cervical AROM Comments:pain, area   Forward flexion 42     Extension 51     SB right 25  Increased right cervical pain   SB left  43     Rotation right 44 Increased right cervical pain Rotation left 23                      Current: Flexion 55, extension 53, SB right 36, SB left 43, rotation right 48, rotation left 40.     10/19/2020     Long Term Goals: To be accomplished in 8 weeks:                   Patient will increase cervical strength to 5/5 MMT throughout to aid in completion of ADLs.                  Status at IE:  Cervical Strength Comments:pain, area   Forward flexion 4+     Extension 4     SB right 3+  Increased right cervical pain   SB left  4     Rotation right 3+ Increased right cervical pain   Rotation left 4                      Current: Cervical rotation right and side bend right improved to 4/5 with less pain reported at end range.  10/8/2020                      Patient will report pain no greater than 1-2/10 throughout entire day to aid in completion of ADLs.                  Status at [address] [place] [address] 6-10/10                   Current: Pain now 4/10 at home and decreases to 2/10 after exercises and traction.   10/5/2020, continueing approx same                       Patent will be able to lift  10 lbs overhead without pain to be able to return to full work duties.                  Status at IE: 0 lbs                   Current: Patient now able to lift 4 pounds overhead without increased pain.   10/8/2020                      Patient will improve FOTO (an established functional score where 100 = no disability) score to 60 points to demonstrate improvement in functional status.                  Status at IE: 48                   Current: 52    10/8/2020         PLAN  []  Upgrade activities as tolerated     [x]  Continue plan of care  []  Update interventions per flow sheet       []  Discharge due to:_  []  Other:_      Linda Drivers, PT, DPT 10/29/2020  8:06 AM    No future appointments.

## 2020-11-10 ENCOUNTER — HOSPITAL ENCOUNTER (OUTPATIENT)
Dept: PHYSICAL THERAPY | Age: 40
Discharge: HOME | End: 2020-11-10
Payer: MEDICAID

## 2020-11-10 PROCEDURE — 97110 THERAPEUTIC EXERCISES: CPT | Performed by: PHYSICAL THERAPIST

## 2020-11-10 PROCEDURE — 97112 NEUROMUSCULAR REEDUCATION: CPT | Performed by: PHYSICAL THERAPIST

## 2020-11-10 NOTE — PROGRESS NOTES
PT DAILY TREATMENT NOTE    Patient Name: Casimiro Swan  Date:11/10/2020  : 1980  [x]  Patient  Verified  Payor: BLUE CROSS MEDICAID / Plan: Ch[place] / Product Type: Managed Care Medicaid /    In time:9:30  Out time:10:31  Total Treatment Time (min): 31  Total Timed Codes (min): 31  1:1 Treatment Time (MC only): 31   Visit #: 13 of 16    Treatment Area: Neck pain [M54.2]    SUBJECTIVE  Pain Level (0-10 scale): 3 in neck  Any medication changes, allergies to medications, adverse drug reactions, diagnosis change, or new procedure performed?: [x] No    [] Yes (see summary sheet for update)  Subjective functional status/changes:   [] No changes reported  Feels so-so. My sciatic is bothering me and the pinched nerve in my neck. OBJECTIVE    16 min Therapeutic Exercise:  [x] See flow sheet :   Rationale: increase ROM, increase strength and decrease pain to improve the patients ability to complete ADLs    15 min Neuromuscular Re-education:  [x]? ???  See flow sheet :   Rationale: improve coordination, improve balance and increase proprioception  to improve the patients ability to complete ADLS, and decrease falls risk                   With   [] TE   [] TA   [] neuro   [] other: Patient Education: [x] Review HEP    [] Progressed/Changed HEP based on:   [] positioning   [] body mechanics   [] transfers   [] heat/ice application    [] other:      Other Objective/Functional Measures: NA     Pain Level (0-10 scale) post treatment: 2    ASSESSMENT/Changes in Function: Patient responds well to treatment session. Patient has minimal difficulty with exercises as prescribed. Does have some soreness today, likely due to inactivity while being sick last week. Will progress as tolerated.  Session was short due to patient's own time constraints No adverse effects were noted from today's treatment session       Patient will continue to benefit from skilled PT services to modify and progress therapeutic interventions, address functional mobility deficits, address ROM deficits, address strength deficits, analyze and address soft tissue restrictions, analyze and cue movement patterns, analyze and modify body mechanics/ergonomics, assess and modify postural abnormalities    []  See Plan of Care  []  See progress note/recertification  []  See Discharge Summary         Progress towards goals / Updated goals:  Short Term Goals: To be accomplished in 4 weeks:                   Patient will report compliance with HEP 1x/day to aid in rehabilitation program.                   Status at Ie: Patient to be instructed in and provided written copy of initial Home Exercise Program at first treatment session.                   Current: Added self soft tissue mobilization and next level stabilization and mobilization exercises to HEP.    10/29/2020                       Patient will increase cervical ROM to WNL in all planes to aid in completion of ADLs.                  Status at IE:  Cervical AROM Comments:pain, area   Forward flexion 42     Extension 51     SB right 25  Increased right cervical pain   SB left  43     Rotation right 44 Increased right cervical pain   Rotation left 23                      Current: Flexion 55, extension 53, SB right 36, SB left 43, rotation right 48, rotation left 40.     10/19/2020     Long Term Goals: To be accomplished in 8 weeks:                   Patient will increase cervical strength to 5/5 MMT throughout to aid in completion of ADLs.                    Status at IE:  Cervical Strength Comments:pain, area   Forward flexion 4+     Extension 4     SB right 3+  Increased right cervical pain   SB left  4     Rotation right 3+ Increased right cervical pain   Rotation left 4                      Current: Cervical rotation right and side bend right improved to 4/5 with less pain reported at end range.  10/8/2020                      Patient will report pain no greater than 1-2/10 throughout entire day to aid in completion of ADLs.                  Status at [address] [place] [address] 6-10/10                   Current: Pain now 4/10 at home and decreases to 2/10 after exercises and traction.   10/5/2020, continueing approx same                       Patent will be able to lift  10 lbs overhead without pain to be able to return to full work duties.                  Status at IE: 0 lbs                   Current: Patient now able to lift 4 pounds overhead without increased pain.   10/8/2020                      Patient will improve FOTO (an established functional score where 100 = no disability) score to 60 points to demonstrate improvement in functional status.                    Status at IE: 48                   Current: 52    10/8/2020      PLAN  []  Upgrade activities as tolerated     [x]  Continue plan of care  []  Update interventions per flow sheet       []  Discharge due to:_  []  Other:_      Enrique Ruff PT, DPT 11/10/2020  9:34 AM    Future Appointments   Date Time Provider Ivan Nails   11/13/2020  8:00 AM [facility]

## 2020-11-13 ENCOUNTER — HOSPITAL ENCOUNTER (OUTPATIENT)
Dept: PHYSICAL THERAPY | Age: 40
Discharge: HOME | End: 2020-11-13
Payer: MEDICAID

## 2020-11-13 PROCEDURE — 97110 THERAPEUTIC EXERCISES: CPT
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
## 2022-08-18 ENCOUNTER — HOSPITAL ENCOUNTER (OUTPATIENT)
Dept: PHYSICAL THERAPY | Age: 42
Discharge: HOME | End: 2022-08-18
Payer: COMMERCIAL

## 2022-08-18 PROCEDURE — 97530 THERAPEUTIC ACTIVITIES: CPT

## 2022-08-18 PROCEDURE — 97162 PT EVAL MOD COMPLEX 30 MIN: CPT

## 2022-08-18 PROCEDURE — 97110 THERAPEUTIC EXERCISES: CPT
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
## 2022-08-24 ENCOUNTER — TELEPHONE (OUTPATIENT)
Dept: PHYSICAL THERAPY | Age: 42
End: 2022-08-24

## 2022-08-25 ENCOUNTER — TELEPHONE (OUTPATIENT)
Dept: PHYSICAL THERAPY | Age: 42
End: 2022-08-25

## 2022-08-31 ENCOUNTER — TELEPHONE (OUTPATIENT)
Dept: PHYSICAL THERAPY | Age: 42
End: 2022-08-31
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
